# Patient Record
Sex: FEMALE | Race: WHITE | NOT HISPANIC OR LATINO | Employment: UNEMPLOYED | URBAN - METROPOLITAN AREA
[De-identification: names, ages, dates, MRNs, and addresses within clinical notes are randomized per-mention and may not be internally consistent; named-entity substitution may affect disease eponyms.]

---

## 2017-02-13 ENCOUNTER — COMMUNICATION - HEALTHEAST (OUTPATIENT)
Dept: FAMILY MEDICINE | Facility: CLINIC | Age: 37
End: 2017-02-13

## 2017-02-13 DIAGNOSIS — F41.9 ANXIETY AND DEPRESSION: ICD-10-CM

## 2017-02-13 DIAGNOSIS — F32.A ANXIETY AND DEPRESSION: ICD-10-CM

## 2017-04-01 ENCOUNTER — COMMUNICATION - HEALTHEAST (OUTPATIENT)
Dept: FAMILY MEDICINE | Facility: CLINIC | Age: 37
End: 2017-04-01

## 2017-08-25 ENCOUNTER — COMMUNICATION - HEALTHEAST (OUTPATIENT)
Dept: FAMILY MEDICINE | Facility: CLINIC | Age: 37
End: 2017-08-25

## 2017-09-08 ENCOUNTER — PRENATAL OFFICE VISIT - HEALTHEAST (OUTPATIENT)
Dept: FAMILY MEDICINE | Facility: CLINIC | Age: 37
End: 2017-09-08

## 2017-09-08 ENCOUNTER — HOSPITAL ENCOUNTER (OUTPATIENT)
Dept: ULTRASOUND IMAGING | Facility: HOSPITAL | Age: 37
Discharge: HOME | End: 2017-09-08
Attending: PHYSICIAN ASSISTANT

## 2017-09-08 DIAGNOSIS — Z34.90 NORMAL PREGNANCY: ICD-10-CM

## 2017-09-08 DIAGNOSIS — N83.202 CYST OF LEFT OVARY: ICD-10-CM

## 2017-09-08 DIAGNOSIS — N92.6 ABNORMAL MENSES: ICD-10-CM

## 2017-09-08 DIAGNOSIS — O09.529 ADVANCED MATERNAL AGE IN MULTIGRAVIDA: ICD-10-CM

## 2017-09-08 DIAGNOSIS — Z32.01 POSITIVE PREGNANCY TEST: ICD-10-CM

## 2017-09-08 LAB — HIV 1+2 AB+HIV1 P24 AG SERPL QL IA: NEGATIVE

## 2017-09-09 LAB — HBV SURFACE AG SERPL QL IA: NEGATIVE

## 2017-09-11 LAB
HAV IGG SER QL IA: POSITIVE
HEPATITIS B SURFACE ANTIBODY LHE- HISTORICAL: NEGATIVE
SYPHILIS RPR SCREEN - HISTORICAL: NORMAL

## 2017-10-13 ENCOUNTER — TRANSFERRED RECORDS (OUTPATIENT)
Dept: HEALTH INFORMATION MANAGEMENT | Facility: CLINIC | Age: 37
End: 2017-10-13

## 2017-10-13 ENCOUNTER — PRENATAL OFFICE VISIT - HEALTHEAST (OUTPATIENT)
Dept: FAMILY MEDICINE | Facility: CLINIC | Age: 37
End: 2017-10-13

## 2017-10-13 DIAGNOSIS — N89.8 VAGINAL DISCHARGE: ICD-10-CM

## 2017-10-13 DIAGNOSIS — Z34.92 NORMAL PREGNANCY IN SECOND TRIMESTER: ICD-10-CM

## 2017-10-13 DIAGNOSIS — N83.202 LEFT OVARIAN CYST: ICD-10-CM

## 2017-10-13 DIAGNOSIS — O09.522 ELDERLY MULTIGRAVIDA IN SECOND TRIMESTER: ICD-10-CM

## 2017-10-13 LAB
HPV ABSTRACT: ABNORMAL
PAP-ABSTRACT: NORMAL

## 2017-10-13 ASSESSMENT — MIFFLIN-ST. JEOR: SCORE: 1152.16

## 2017-10-14 ENCOUNTER — COMMUNICATION - HEALTHEAST (OUTPATIENT)
Dept: FAMILY MEDICINE | Facility: CLINIC | Age: 37
End: 2017-10-14

## 2017-10-17 ENCOUNTER — AMBULATORY - HEALTHEAST (OUTPATIENT)
Dept: LAB | Facility: CLINIC | Age: 37
End: 2017-10-17

## 2017-10-17 ENCOUNTER — COMMUNICATION - HEALTHEAST (OUTPATIENT)
Dept: FAMILY MEDICINE | Facility: CLINIC | Age: 37
End: 2017-10-17

## 2017-10-17 DIAGNOSIS — Z34.92 NORMAL PREGNANCY IN SECOND TRIMESTER: ICD-10-CM

## 2017-10-17 DIAGNOSIS — O98.812 CHLAMYDIA INFECTION AFFECTING PREGNANCY IN SECOND TRIMESTER, ANTEPARTUM: ICD-10-CM

## 2017-10-17 DIAGNOSIS — A74.9 CHLAMYDIA INFECTION AFFECTING PREGNANCY IN SECOND TRIMESTER, ANTEPARTUM: ICD-10-CM

## 2017-10-23 LAB
HPV INTERPRETATION - HISTORICAL: ABNORMAL
HPV INTERPRETER - HISTORICAL: ABNORMAL

## 2017-10-24 LAB
BKR LAB AP ABNORMAL BLEEDING: NO
BKR LAB AP BIRTH CONTROL/HORMONES: ABNORMAL
BKR LAB AP CERVICAL APPEARANCE: NORMAL
BKR LAB AP GYN ADEQUACY: ABNORMAL
BKR LAB AP GYN INTERPRETATION: ABNORMAL
BKR LAB AP HPV REFLEX: ABNORMAL
BKR LAB AP LMP: ABNORMAL
BKR LAB AP PATIENT STATUS: ABNORMAL
BKR LAB AP PREVIOUS ABNORMAL: ABNORMAL
BKR LAB AP PREVIOUS NORMAL: ABNORMAL
HIGH RISK?: YES
PATH REPORT.COMMENTS IMP SPEC: ABNORMAL
RESULT FLAG (HE HISTORICAL CONVERSION): ABNORMAL

## 2017-10-25 ENCOUNTER — COMMUNICATION - HEALTHEAST (OUTPATIENT)
Dept: FAMILY MEDICINE | Facility: CLINIC | Age: 37
End: 2017-10-25

## 2017-10-25 DIAGNOSIS — R87.613 HSIL (HIGH GRADE SQUAMOUS INTRAEPITHELIAL LESION) ON PAP SMEAR OF CERVIX: ICD-10-CM

## 2017-10-25 DIAGNOSIS — Z3A.14 14 WEEKS GESTATION OF PREGNANCY: ICD-10-CM

## 2017-11-27 ENCOUNTER — PRENATAL OFFICE VISIT - HEALTHEAST (OUTPATIENT)
Dept: FAMILY MEDICINE | Facility: CLINIC | Age: 37
End: 2017-11-27

## 2017-11-27 DIAGNOSIS — Z34.92 NORMAL PREGNANCY IN SECOND TRIMESTER: ICD-10-CM

## 2017-11-27 DIAGNOSIS — A74.9 CHLAMYDIA: ICD-10-CM

## 2017-11-27 DIAGNOSIS — N83.202 LEFT OVARIAN CYST: ICD-10-CM

## 2017-11-28 ENCOUNTER — HOSPITAL ENCOUNTER (OUTPATIENT)
Dept: ULTRASOUND IMAGING | Facility: HOSPITAL | Age: 37
Discharge: HOME | End: 2017-11-28
Attending: FAMILY MEDICINE

## 2017-11-28 DIAGNOSIS — Z34.92 NORMAL PREGNANCY IN SECOND TRIMESTER: ICD-10-CM

## 2018-02-06 ENCOUNTER — COMMUNICATION - HEALTHEAST (OUTPATIENT)
Dept: FAMILY MEDICINE | Facility: CLINIC | Age: 38
End: 2018-02-06

## 2018-02-16 ENCOUNTER — COMMUNICATION - HEALTHEAST (OUTPATIENT)
Dept: FAMILY MEDICINE | Facility: CLINIC | Age: 38
End: 2018-02-16

## 2018-02-19 ENCOUNTER — COMMUNICATION - HEALTHEAST (OUTPATIENT)
Dept: FAMILY MEDICINE | Facility: CLINIC | Age: 38
End: 2018-02-19

## 2018-02-21 ENCOUNTER — COMMUNICATION - HEALTHEAST (OUTPATIENT)
Dept: FAMILY MEDICINE | Facility: CLINIC | Age: 38
End: 2018-02-21

## 2018-03-03 ENCOUNTER — COMMUNICATION - HEALTHEAST (OUTPATIENT)
Dept: FAMILY MEDICINE | Facility: CLINIC | Age: 38
End: 2018-03-03

## 2018-03-08 ENCOUNTER — COMMUNICATION - HEALTHEAST (OUTPATIENT)
Dept: FAMILY MEDICINE | Facility: CLINIC | Age: 38
End: 2018-03-08

## 2018-03-30 ENCOUNTER — COMMUNICATION - HEALTHEAST (OUTPATIENT)
Dept: FAMILY MEDICINE | Facility: CLINIC | Age: 38
End: 2018-03-30

## 2018-04-17 ENCOUNTER — ANESTHESIA - HEALTHEAST (OUTPATIENT)
Dept: OBGYN | Facility: HOSPITAL | Age: 38
End: 2018-04-17

## 2018-04-17 ENCOUNTER — RECORDS - HEALTHEAST (OUTPATIENT)
Dept: ADMINISTRATIVE | Facility: OTHER | Age: 38
End: 2018-04-17

## 2018-04-17 ENCOUNTER — SURGERY - HEALTHEAST (OUTPATIENT)
Dept: OBGYN | Facility: HOSPITAL | Age: 38
End: 2018-04-17

## 2018-04-17 ASSESSMENT — MIFFLIN-ST. JEOR: SCORE: 1197.52

## 2018-04-21 ENCOUNTER — HOME CARE/HOSPICE - HEALTHEAST (OUTPATIENT)
Dept: HOME HEALTH SERVICES | Facility: HOME HEALTH | Age: 38
End: 2018-04-21

## 2018-04-22 ENCOUNTER — COMMUNICATION - HEALTHEAST (OUTPATIENT)
Dept: SCHEDULING | Facility: CLINIC | Age: 38
End: 2018-04-22

## 2018-05-03 ENCOUNTER — COMMUNICATION - HEALTHEAST (OUTPATIENT)
Dept: FAMILY MEDICINE | Facility: CLINIC | Age: 38
End: 2018-05-03

## 2019-09-27 ENCOUNTER — HOSPITAL ENCOUNTER (OUTPATIENT)
Facility: CLINIC | Age: 39
Discharge: HOME | End: 2019-09-27
Attending: OBSTETRICS & GYNECOLOGY | Admitting: OBSTETRICS & GYNECOLOGY

## 2019-09-27 ENCOUNTER — APPOINTMENT (OUTPATIENT)
Dept: ULTRASOUND IMAGING | Facility: CLINIC | Age: 39
End: 2019-09-27
Attending: OBSTETRICS & GYNECOLOGY

## 2019-09-27 ENCOUNTER — PREP FOR PROCEDURE (OUTPATIENT)
Dept: OBGYN | Facility: CLINIC | Age: 39
End: 2019-09-27

## 2019-09-27 VITALS — SYSTOLIC BLOOD PRESSURE: 114 MMHG | RESPIRATION RATE: 20 BRPM | TEMPERATURE: 98.9 F | DIASTOLIC BLOOD PRESSURE: 71 MMHG

## 2019-09-27 DIAGNOSIS — O34.219 PREVIOUS CESAREAN DELIVERY, ANTEPARTUM CONDITION OR COMPLICATION: Primary | ICD-10-CM

## 2019-09-27 PROBLEM — O26.899 ABDOMINAL PAIN IN PREGNANCY: Status: ACTIVE | Noted: 2019-09-27

## 2019-09-27 PROBLEM — R10.9 ABDOMINAL PAIN IN PREGNANCY: Status: ACTIVE | Noted: 2019-09-27

## 2019-09-27 PROBLEM — Z34.80 SUPERVISION OF OTHER NORMAL PREGNANCY: Status: ACTIVE | Noted: 2019-09-27

## 2019-09-27 LAB
ABO + RH BLD: NORMAL
ABO + RH BLD: NORMAL
ALBUMIN UR-MCNC: NEGATIVE MG/DL
ALT SERPL W P-5'-P-CCNC: 11 U/L (ref 0–50)
AMPHETAMINES UR QL SCN: NEGATIVE
APPEARANCE UR: CLEAR
AST SERPL W P-5'-P-CCNC: 15 U/L (ref 0–45)
BARBITURATES UR QL: NEGATIVE
BENZODIAZ UR QL: NEGATIVE
BILIRUB UR QL STRIP: NEGATIVE
BLD GP AB SCN SERPL QL: NORMAL
BLOOD BANK CMNT PATIENT-IMP: NORMAL
CANNABINOIDS UR QL SCN: NEGATIVE
COCAINE UR QL: NEGATIVE
COLOR UR AUTO: ABNORMAL
CREAT SERPL-MCNC: 0.47 MG/DL (ref 0.52–1.04)
CREAT UR-MCNC: 29 MG/DL
ERYTHROCYTE [DISTWIDTH] IN BLOOD BY AUTOMATED COUNT: 16.8 % (ref 10–15)
GFR SERPL CREATININE-BSD FRML MDRD: >90 ML/MIN/{1.73_M2}
GLUCOSE UR STRIP-MCNC: NEGATIVE MG/DL
HBA1C MFR BLD: 5 % (ref 0–5.6)
HCT VFR BLD AUTO: 28.1 % (ref 35–47)
HGB BLD-MCNC: 8.2 G/DL (ref 11.7–15.7)
HGB UR QL STRIP: NEGATIVE
KETONES UR STRIP-MCNC: NEGATIVE MG/DL
LEUKOCYTE ESTERASE UR QL STRIP: ABNORMAL
MCH RBC QN AUTO: 22.5 PG (ref 26.5–33)
MCHC RBC AUTO-ENTMCNC: 29.2 G/DL (ref 31.5–36.5)
MCV RBC AUTO: 77 FL (ref 78–100)
NITRATE UR QL: NEGATIVE
OPIATES UR QL SCN: NEGATIVE
PCP UR QL SCN: NEGATIVE
PH UR STRIP: 7 PH (ref 5–7)
PLATELET # BLD AUTO: 223 10E9/L (ref 150–450)
PROT UR-MCNC: 0.12 G/L
PROT/CREAT 24H UR: 0.41 G/G CR (ref 0–0.2)
RBC # BLD AUTO: 3.65 10E12/L (ref 3.8–5.2)
RBC #/AREA URNS AUTO: <1 /HPF (ref 0–2)
SOURCE: ABNORMAL
SP GR UR STRIP: 1.01 (ref 1–1.03)
SPECIMEN EXP DATE BLD: NORMAL
SQUAMOUS #/AREA URNS AUTO: 3 /HPF (ref 0–1)
UROBILINOGEN UR STRIP-MCNC: 0 MG/DL (ref 0–2)
WBC # BLD AUTO: 11.1 10E9/L (ref 4–11)
WBC #/AREA URNS AUTO: 2 /HPF (ref 0–5)

## 2019-09-27 PROCEDURE — 76805 OB US >/= 14 WKS SNGL FETUS: CPT

## 2019-09-27 PROCEDURE — 87491 CHLMYD TRACH DNA AMP PROBE: CPT | Performed by: OBSTETRICS & GYNECOLOGY

## 2019-09-27 PROCEDURE — 80307 DRUG TEST PRSMV CHEM ANLYZR: CPT | Performed by: OBSTETRICS & GYNECOLOGY

## 2019-09-27 PROCEDURE — 87591 N.GONORRHOEAE DNA AMP PROB: CPT | Performed by: OBSTETRICS & GYNECOLOGY

## 2019-09-27 PROCEDURE — 81001 URINALYSIS AUTO W/SCOPE: CPT | Performed by: OBSTETRICS & GYNECOLOGY

## 2019-09-27 PROCEDURE — 82565 ASSAY OF CREATININE: CPT | Performed by: OBSTETRICS & GYNECOLOGY

## 2019-09-27 PROCEDURE — 86780 TREPONEMA PALLIDUM: CPT | Performed by: OBSTETRICS & GYNECOLOGY

## 2019-09-27 PROCEDURE — 87389 HIV-1 AG W/HIV-1&-2 AB AG IA: CPT | Performed by: OBSTETRICS & GYNECOLOGY

## 2019-09-27 PROCEDURE — 83036 HEMOGLOBIN GLYCOSYLATED A1C: CPT | Performed by: OBSTETRICS & GYNECOLOGY

## 2019-09-27 PROCEDURE — 87653 STREP B DNA AMP PROBE: CPT | Performed by: OBSTETRICS & GYNECOLOGY

## 2019-09-27 PROCEDURE — 86901 BLOOD TYPING SEROLOGIC RH(D): CPT | Performed by: OBSTETRICS & GYNECOLOGY

## 2019-09-27 PROCEDURE — 84450 TRANSFERASE (AST) (SGOT): CPT | Performed by: OBSTETRICS & GYNECOLOGY

## 2019-09-27 PROCEDURE — 36415 COLL VENOUS BLD VENIPUNCTURE: CPT | Performed by: OBSTETRICS & GYNECOLOGY

## 2019-09-27 PROCEDURE — 99213 OFFICE O/P EST LOW 20 MIN: CPT | Mod: 25 | Performed by: OBSTETRICS & GYNECOLOGY

## 2019-09-27 PROCEDURE — 86762 RUBELLA ANTIBODY: CPT | Performed by: OBSTETRICS & GYNECOLOGY

## 2019-09-27 PROCEDURE — 85027 COMPLETE CBC AUTOMATED: CPT | Performed by: OBSTETRICS & GYNECOLOGY

## 2019-09-27 PROCEDURE — 86850 RBC ANTIBODY SCREEN: CPT | Performed by: OBSTETRICS & GYNECOLOGY

## 2019-09-27 PROCEDURE — 84460 ALANINE AMINO (ALT) (SGPT): CPT | Performed by: OBSTETRICS & GYNECOLOGY

## 2019-09-27 PROCEDURE — 59025 FETAL NON-STRESS TEST: CPT | Mod: 26 | Performed by: OBSTETRICS & GYNECOLOGY

## 2019-09-27 PROCEDURE — 84156 ASSAY OF PROTEIN URINE: CPT | Performed by: OBSTETRICS & GYNECOLOGY

## 2019-09-27 PROCEDURE — 86900 BLOOD TYPING SEROLOGIC ABO: CPT | Performed by: OBSTETRICS & GYNECOLOGY

## 2019-09-27 PROCEDURE — 87340 HEPATITIS B SURFACE AG IA: CPT | Performed by: OBSTETRICS & GYNECOLOGY

## 2019-09-27 PROCEDURE — 25800030 ZZH RX IP 258 OP 636: Performed by: OBSTETRICS & GYNECOLOGY

## 2019-09-27 PROCEDURE — G0463 HOSPITAL OUTPT CLINIC VISIT: HCPCS | Mod: 25

## 2019-09-27 PROCEDURE — 96360 HYDRATION IV INFUSION INIT: CPT

## 2019-09-27 PROCEDURE — 40000809 ZZH STATISTIC NO DOCUMENTATION TO SUPPORT CHARGE

## 2019-09-27 PROCEDURE — 59025 FETAL NON-STRESS TEST: CPT

## 2019-09-27 RX ORDER — CEFAZOLIN SODIUM 1 G/50ML
1 INJECTION, SOLUTION INTRAVENOUS SEE ADMIN INSTRUCTIONS
Status: CANCELLED | OUTPATIENT
Start: 2019-09-27

## 2019-09-27 RX ORDER — ONDANSETRON 2 MG/ML
4 INJECTION INTRAMUSCULAR; INTRAVENOUS EVERY 6 HOURS PRN
Status: DISCONTINUED | OUTPATIENT
Start: 2019-09-27 | End: 2019-09-27

## 2019-09-27 RX ORDER — ONDANSETRON 2 MG/ML
4 INJECTION INTRAMUSCULAR; INTRAVENOUS EVERY 6 HOURS PRN
Status: DISCONTINUED | OUTPATIENT
Start: 2019-09-27 | End: 2019-09-27 | Stop reason: HOSPADM

## 2019-09-27 RX ORDER — CEFAZOLIN SODIUM 2 G/100ML
2 INJECTION, SOLUTION INTRAVENOUS
Status: CANCELLED | OUTPATIENT
Start: 2019-09-27

## 2019-09-27 RX ORDER — PHENAZOPYRIDINE HYDROCHLORIDE 100 MG/1
200 TABLET, FILM COATED ORAL ONCE
Status: CANCELLED | OUTPATIENT
Start: 2019-09-27 | End: 2019-09-27

## 2019-09-27 RX ADMIN — SODIUM CHLORIDE, POTASSIUM CHLORIDE, SODIUM LACTATE AND CALCIUM CHLORIDE 1000 ML: 600; 310; 30; 20 INJECTION, SOLUTION INTRAVENOUS at 17:26

## 2019-09-27 NOTE — H&P
"[facility]   OB/GYN     History and Physical Exam     Subjective:    Courtney is a 39 year old  at 39w0d by LMP of Dec 28th, 2018 who presents to triage with decreased fetal movement, left lower quadrant abdominal pain and lower extremity swelling.   She has had no prenatal care in this pregnancy due to a lack of health insurance. She is currently living with her fiandrae's father and has been in contact with the [facility] . Her insurance is in process, but does have the number for the [employer] and plans to talk to them. Her 19 year old son lives with his girlfriend. 16yo daughter lives with an aunt in [place]. 17 month old daughter lives with her and her fiance.   She denies any vaginal bleeding or loss of fluid. She does have left abdominal pain that wraps around to her lower back, which she reports has been a chronic problem in this pregnancy, and even pre-dates pregnancy. She does not feel any contractions.   Reports her swelling has improved with elevating her feet.   Reports eating and drinking normally with a regular diet including red meat.    Pregnancy complications:   -- No PNC  -- Hx of  delivery for placental abruption   -- History of substance use, but denies use in this pregnancy   -- History of anemia, previously on iron, required 2U blood transfusion with c/s delivery/abruption   -- Depression/anxiety/PTSD; feels depressed currently mostly due to their financial troubles, no meds, reports fleeting thoughts that she'd \"be better off dead,\" but denies any plans to act on these thoughts, unable to afford to pay cash for her meds   -- Desired sterilization, but was not sterilized with her last delivery due to limited PNC     OB History    Para Term  AB Living   8 3 2 0 4 3   SAB TAB Ectopic Multiple Live Births   2 2 0 0 3      # Outcome Date GA Lbr Demarco/2nd Weight Sex Delivery Anes PTL Lv   8 Current            7 Term 18 " "39w0d  3.742 kg (8 lb 4 oz) F CS-LTranv   WOODY   6 SAB 01/01/15        FD   5 SAB 14        FD   4 TAB 05        FD   3 TAB 03        FD   2 Para 02 40w1d  3.742 kg (8 lb 4 oz)  Vag-Spont   WOODY   1 Term 00 39w0d  2.977 kg (6 lb 9 oz)  Vag-Spont   WOODY   She denies issues with diabetes or high blood pressure.     Past Medical History:   Diagnosis Date     Chronic back pain      History of anemia      History of substance abuse        Past Surgical History:   Procedure Laterality Date     BACK SURGERY      Age 15 for bone spur       SECTION      2018 for abruption        No current outpatient medications on file.       Family History   Problem Relation Age of Onset     Depression Mother      Unknown/Adopted Father      Depression Brother      Unknown/Adopted Maternal Grandmother      Unknown/Adopted Maternal Grandfather        Social History     Tobacco Use     Smoking status: Never Smoker     Smokeless tobacco: Never Used   Substance Use Topics     Alcohol use: Yes     Comment: quit with pregnancy       ROS: A 10 pt ROS was completed and found to be negative unless mentioned in the HPI.     Objective:  /79   Temp 98.9  F (37.2  C)   Resp 20     Estimated body mass index is 18.42 kg/m  as calculated from the following:    Height as of 14: 1.6 m (5' 3\").    Weight as of 14: 47.2 kg (104 lb).    Physical Exam:  Gen: Pleasant, talkative female in no apparent distress   Respiratory: breathing comfortably on room air   Cardiac: Regular rate, warm and well-perfused.   GI: Abd soft and non-tender, cephalic fetal presentation, EFW 6lbs  MSK: Grossly normal movement of all four extremities  Psych: mood and affect bright   Lower extremity: edema not present   Cervix: closed/long/high     NST: baseline 130-150, mod variability, + accels, no decels  [place]: 2-4 in 10 min, mild, not palpable by the patient     Component      Latest Ref Rng & Units 2019   Color Urine       " Straw   Appearance Urine       Clear   Glucose Urine      NEG:Negative mg/dL Negative   Bilirubin Urine      NEG:Negative Negative   Ketones Urine      NEG:Negative mg/dL Negative   Specific Gravity Urine      1.003 - 1.035 1.009   Blood Urine      NEG:Negative Negative   pH Urine      5.0 - 7.0 pH 7.0   Protein Albumin Urine      NEG:Negative mg/dL Negative   Urobilinogen mg/dL      0.0 - 2.0 mg/dL 0.0   Nitrite Urine      NEG:Negative Negative   Leukocyte Esterase Urine      NEG:Negative Trace (A)   Source       Midstream Urine   WBC Urine      0 - 5 /HPF 2   RBC Urine      0 - 2 /HPF <1   Squamous Epithelial /HPF Urine      0 - 1 /HPF 3 (H)   WBC      4.0 - 11.0 10e9/L 11.1 (H)   RBC Count      3.8 - 5.2 10e12/L 3.65 (L)   Hemoglobin      11.7 - 15.7 g/dL 8.2 (L)   Hematocrit      35.0 - 47.0 % 28.1 (L)   MCV      78 - 100 fl 77 (L)   MCH      26.5 - 33.0 pg 22.5 (L)   MCHC      31.5 - 36.5 g/dL 29.2 (L)   RDW      10.0 - 15.0 % 16.8 (H)   Platelet Count      150 - 450 10e9/L 223   Amphetamine Qual Urine      NEG:Negative Negative   Barbiturates Qual Urine      NEG:Negative Negative   Benzodiazepine Qual Urine      NEG:Negative Negative   Cannabinoids Qual Urine      NEG:Negative Negative   Cocaine Qual Urine      NEG:Negative Negative   Opiates Qualitative Urine      NEG:Negative Negative   Pcp Qual Urine      NEG:Negative Negative   ABO       O   RH(D)       Pos   Antibody Screen       Neg   Test Valid Only At       [facility]   Specimen Expires       2019   Creatinine      0.52 - 1.04 mg/dL 0.47 (L)   GFR Estimate      >60 mL/min/1.73:m2 >90   GFR Estimate If Black      >60 mL/min/1.73:m2 >90   Protein Random Urine      g/L 0.12   Protein Total Urine g/gr Creatinine      0 - 0.2 g/g Cr 0.41 (H)   AST      0 - 45 U/L 15   ALT      0 - 50 U/L 11   Creatinine Urine      mg/dL 29   Hemoglobin A1C      0 - 5.6 % 5.0     Assessment/Plan: 38 yo  at 39w0d by LMP c/w 39w0d US who presents for  decreased fetal movement, left lower quadrant abdominal pain and lower extremity swelling. All of these symptoms had resolved by the time I interviewed and examined the patient.     -- No PNC; UDS negative, # given for [facility], declines [facility] as she has a [facility]. I did draw her PMC labs, collected a GBS and performed an obstetrical US.   -- Hx of  delivery for placental abruption: discussed delivery options including repeat  delivery vs TOLAC. Discussed risks of uterine rupture and need for emergent  delivery. Discussed the risks of repeat c/s including bleeding/infection/intraabdominal injury. Discussed expected recoveries with both options. Pt would like repeat c/s. As far as timing of delivery, the patient has very poor obstetrical dating (LMP and a 3rd trimester OB US). I discussed that this US could be as far as 3 weeks off in the 3rd trimester, and the potential poor fetal outcomes associated with a  delivery. I did discuss delivery timing with MFM Dr. Praveen Moon, who recommended delivery at 40wks. This was scheduled for this patient accordingly. Would delivery sooner should labor arise. Pt has contractions noted on tocometry, but these were not palpated by the patient. Cervix was noted to be close, long and high, so labor ruled out.   -- Polyhydramnios: VANESSA 24.3cm, plan for NST on Mon at scheduled f/u appt. Reviewed this finding with Dr. Praveen Moon who recommended against proceeding with delivery based on this finding, given her poor obstetrical dating. Delivery planned with c/s at 40wks. HgbA1c was drawn and was normal, making gestational diabetes unlikely.   -- Proteinuria: BPs nl during triage visit with no noted edema. Will continue to monitor at her planned OB visit. HELLP labs nl.   -- History of substance use: but denies use in this pregnancy, UDS neg   -- History of anemia, previously on iron, required 2U blood transfusion with c/s  "delivery/abruption: Hgb 8.2 on CBC today. Recommended high iron diet until delivery. Pt unable to afford po iron and little utility for IV iron this close to surgery. Anticipate possible need for blood at the time of c/s, but will be careful to minimize blood loss.    -- Depression/anxiety/PTSD; feels depressed currently mostly due to their financial troubles, no meds, reports fleeting thoughts that she'd \"be better off dead,\" but denies any plans to act on these thoughts, unable to afford to pay cash for her meds. SI/HI precautions reviewed.   -- Desired sterilization, but was not sterilized with her last delivery due to limited PNC, FTP signed today, but I did discuss that she won't have met the one month waiting period after signing FTP to complete a tubal ligation. Discussed options of interval tubal, IUD placed at the time of surgery, Nexplanon or depo provera placed during her postpartum hospitalization. Discussed side effects and bleeding profiles with each option, and she strongly desires placement of a Nexplanon within her hospitalization. Given her high risk of loss to follow-up and unintended pregnancy, I think this is warranted.   -- Fetal well-being: Cat 1 reactive FHT       Appointment was scheduled for 9/30 at 2:45 PM with me in clinic. Will plan an NST at that time      Tanika Cifuentes MD  OB/GYN  9/27/2019        "

## 2019-09-27 NOTE — H&P (VIEW-ONLY)
"[facility]   OB/GYN     History and Physical Exam     Subjective:    Courtney is a 39 year old  at 39w0d by LMP of Dec 28th, 2018 who presents to triage with decreased fetal movement, left lower quadrant abdominal pain and lower extremity swelling.   She has had no prenatal care in this pregnancy due to a lack of health insurance. She is currently living with her fiandrae's father and has been in contact with the [facility] . Her insurance is in process, but does have the number for the [employer] and plans to talk to them. Her 19 year old son lives with his girlfriend. 16yo daughter lives with an aunt in [place]. 17 month old daughter lives with her and her fiance.   She denies any vaginal bleeding or loss of fluid. She does have left abdominal pain that wraps around to her lower back, which she reports has been a chronic problem in this pregnancy, and even pre-dates pregnancy. She does not feel any contractions.   Reports her swelling has improved with elevating her feet.   Reports eating and drinking normally with a regular diet including red meat.    Pregnancy complications:   -- No PNC  -- Hx of  delivery for placental abruption   -- History of substance use, but denies use in this pregnancy   -- History of anemia, previously on iron, required 2U blood transfusion with c/s delivery/abruption   -- Depression/anxiety/PTSD; feels depressed currently mostly due to their financial troubles, no meds, reports fleeting thoughts that she'd \"be better off dead,\" but denies any plans to act on these thoughts, unable to afford to pay cash for her meds   -- Desired sterilization, but was not sterilized with her last delivery due to limited PNC     OB History    Para Term  AB Living   8 3 2 0 4 3   SAB TAB Ectopic Multiple Live Births   2 2 0 0 3      # Outcome Date GA Lbr Demarco/2nd Weight Sex Delivery Anes PTL Lv   8 Current            7 Term 18 " "39w0d  3.742 kg (8 lb 4 oz) F CS-LTranv   WOODY   6 SAB 01/01/15        FD   5 SAB 14        FD   4 TAB 05        FD   3 TAB 03        FD   2 Para 02 40w1d  3.742 kg (8 lb 4 oz)  Vag-Spont   WOODY   1 Term 00 39w0d  2.977 kg (6 lb 9 oz)  Vag-Spont   WOODY   She denies issues with diabetes or high blood pressure.     Past Medical History:   Diagnosis Date     Chronic back pain      History of anemia      History of substance abuse        Past Surgical History:   Procedure Laterality Date     BACK SURGERY      Age 15 for bone spur       SECTION      2018 for abruption        No current outpatient medications on file.       Family History   Problem Relation Age of Onset     Depression Mother      Unknown/Adopted Father      Depression Brother      Unknown/Adopted Maternal Grandmother      Unknown/Adopted Maternal Grandfather        Social History     Tobacco Use     Smoking status: Never Smoker     Smokeless tobacco: Never Used   Substance Use Topics     Alcohol use: Yes     Comment: quit with pregnancy       ROS: A 10 pt ROS was completed and found to be negative unless mentioned in the HPI.     Objective:  /79   Temp 98.9  F (37.2  C)   Resp 20     Estimated body mass index is 18.42 kg/m  as calculated from the following:    Height as of 14: 1.6 m (5' 3\").    Weight as of 14: 47.2 kg (104 lb).    Physical Exam:  Gen: Pleasant, talkative female in no apparent distress   Respiratory: breathing comfortably on room air   Cardiac: Regular rate, warm and well-perfused.   GI: Abd soft and non-tender, cephalic fetal presentation, EFW 6lbs  MSK: Grossly normal movement of all four extremities  Psych: mood and affect bright   Lower extremity: edema not present   Cervix: closed/long/high     NST: baseline 130-150, mod variability, + accels, no decels  [place]: 2-4 in 10 min, mild, not palpable by the patient     Component      Latest Ref Rng & Units 2019   Color Urine       " Straw   Appearance Urine       Clear   Glucose Urine      NEG:Negative mg/dL Negative   Bilirubin Urine      NEG:Negative Negative   Ketones Urine      NEG:Negative mg/dL Negative   Specific Gravity Urine      1.003 - 1.035 1.009   Blood Urine      NEG:Negative Negative   pH Urine      5.0 - 7.0 pH 7.0   Protein Albumin Urine      NEG:Negative mg/dL Negative   Urobilinogen mg/dL      0.0 - 2.0 mg/dL 0.0   Nitrite Urine      NEG:Negative Negative   Leukocyte Esterase Urine      NEG:Negative Trace (A)   Source       Midstream Urine   WBC Urine      0 - 5 /HPF 2   RBC Urine      0 - 2 /HPF <1   Squamous Epithelial /HPF Urine      0 - 1 /HPF 3 (H)   WBC      4.0 - 11.0 10e9/L 11.1 (H)   RBC Count      3.8 - 5.2 10e12/L 3.65 (L)   Hemoglobin      11.7 - 15.7 g/dL 8.2 (L)   Hematocrit      35.0 - 47.0 % 28.1 (L)   MCV      78 - 100 fl 77 (L)   MCH      26.5 - 33.0 pg 22.5 (L)   MCHC      31.5 - 36.5 g/dL 29.2 (L)   RDW      10.0 - 15.0 % 16.8 (H)   Platelet Count      150 - 450 10e9/L 223   Amphetamine Qual Urine      NEG:Negative Negative   Barbiturates Qual Urine      NEG:Negative Negative   Benzodiazepine Qual Urine      NEG:Negative Negative   Cannabinoids Qual Urine      NEG:Negative Negative   Cocaine Qual Urine      NEG:Negative Negative   Opiates Qualitative Urine      NEG:Negative Negative   Pcp Qual Urine      NEG:Negative Negative   ABO       O   RH(D)       Pos   Antibody Screen       Neg   Test Valid Only At       [facility]   Specimen Expires       2019   Creatinine      0.52 - 1.04 mg/dL 0.47 (L)   GFR Estimate      >60 mL/min/1.73:m2 >90   GFR Estimate If Black      >60 mL/min/1.73:m2 >90   Protein Random Urine      g/L 0.12   Protein Total Urine g/gr Creatinine      0 - 0.2 g/g Cr 0.41 (H)   AST      0 - 45 U/L 15   ALT      0 - 50 U/L 11   Creatinine Urine      mg/dL 29   Hemoglobin A1C      0 - 5.6 % 5.0     Assessment/Plan: 38 yo  at 39w0d by LMP c/w 39w0d US who presents for  decreased fetal movement, left lower quadrant abdominal pain and lower extremity swelling. All of these symptoms had resolved by the time I interviewed and examined the patient.     -- No PNC; UDS negative, # given for [employer] [facility], declines [facility] as she has a [facility]. I did draw her PMC labs, collected a GBS and performed an obstetrical US.   -- Hx of  delivery for placental abruption: discussed delivery options including repeat  delivery vs TOLAC. Discussed risks of uterine rupture and need for emergent  delivery. Discussed the risks of repeat c/s including bleeding/infection/intraabdominal injury. Discussed expected recoveries with both options. Pt would like repeat c/s. As far as timing of delivery, the patient has very poor obstetrical dating (LMP and a 3rd trimester OB US). I discussed that this US could be as far as 3 weeks off in the 3rd trimester, and the potential poor fetal outcomes associated with a  delivery. I did discuss delivery timing with MFM Dr. Praveen Moon, who recommended delivery at 40wks. This was scheduled for this patient accordingly. Would delivery sooner should labor arise. Pt has contractions noted on tocometry, but these were not palpated by the patient. Cervix was noted to be close, long and high, so labor ruled out.   -- Polyhydramnios: VANESSA 24.3cm, plan for NST on Mon at scheduled f/u appt. Reviewed this finding with Dr. Praveen Moon who recommended against proceeding with delivery based on this finding, given her poor obstetrical dating. Delivery planned with c/s at 40wks. HgbA1c was drawn and was normal, making gestational diabetes unlikely.   -- Proteinuria: BPs nl during triage visit with no noted edema. Will continue to monitor at her planned OB visit. HELLP labs nl.   -- History of substance use: but denies use in this pregnancy, UDS neg   -- History of anemia, previously on iron, required 2U blood transfusion with c/s  "delivery/abruption: Hgb 8.2 on CBC today. Recommended high iron diet until delivery. Pt unable to afford po iron and little utility for IV iron this close to surgery. Anticipate possible need for blood at the time of c/s, but will be careful to minimize blood loss.    -- Depression/anxiety/PTSD; feels depressed currently mostly due to their financial troubles, no meds, reports fleeting thoughts that she'd \"be better off dead,\" but denies any plans to act on these thoughts, unable to afford to pay cash for her meds. SI/HI precautions reviewed.   -- Desired sterilization, but was not sterilized with her last delivery due to limited PNC, FTP signed today, but I did discuss that she won't have met the one month waiting period after signing FTP to complete a tubal ligation. Discussed options of interval tubal, IUD placed at the time of surgery, Nexplanon or depo provera placed during her postpartum hospitalization. Discussed side effects and bleeding profiles with each option, and she strongly desires placement of a Nexplanon within her hospitalization. Given her high risk of loss to follow-up and unintended pregnancy, I think this is warranted.   -- Fetal well-being: Cat 1 reactive FHT       Appointment was scheduled for 9/30 at 2:45 PM with me in clinic. Will plan an NST at that time      Tanika Cifuentes MD  OB/GYN  9/27/2019        "

## 2019-09-27 NOTE — DISCHARGE INSTRUCTIONS
Discharge Instructions for Undelivered Patients    Diet:    Drink 8 to 12 glasses of liquids (milk, juice, water) every day.    You may eat meals and snacks.    Activity:      Count fetal kicks every day. (See handout.)    Call your doctor if your baby is moving less than usual.    Medicines:    My care team has reviewed my medicines with me.    My care team has given me a list of my medicines.    My care team has prescribed a new medicine. They have either sent it home with me or ordered it from the pharmacy.    Call your provider if you notice:    Swelling in your face or increased swelling in your hands or legs.    Headaches that are not relieved by Tylenol (acetaminophen).    Changes in your vision (blurring; seeing spots or stars).    Nausea (sick to your stomach) and vomiting (throwing up).    Weight gain of 5 pounds per week.    Heartburn that doesn't go away.    Signs of bladder infection: pain when you urinate (use the toilet), needing to go more often or more urgently.    The bag of mota (membranes) breaks, or you notice leaking in your underwear.    Bright red blood in your underwear.    Abdominal (lower belly) or stomach pain.    For first baby: Contractions (tightenings) less than 5 minutes apart for one hour or more.    For Second (plus) baby: Contractions (tightenings) less than 10 minutes apart and getting stronger.    Increase or change in vaginal discharge (note the color and amount).    Other:     Follow up with your provider: Sept 30th, 2:45pm with Dr. Cifuentes at [facility]     appt line [phone]    [place] [phone]

## 2019-09-27 NOTE — PLAN OF CARE
Reviewed policy regarding suicidal risk screen with MD. No need for 1:1 at this time per MD. Patient is not currently suicidal and has no plan. Patient reassured to return to the ED with any increase in suicidal thoughts or a desire to formulate a plan. SO at bedside.

## 2019-09-27 NOTE — PROGRESS NOTES
"Patient arrived via w/c from ER stating she is 39 weeks pregnant at 1341.  She is with her significant other Manuel Alvarado.  Patient has not had prenatal care with this pregnancy due to financial difficulties and inability to obtain financial assistance.  Currently living in [place] with father of sosa Valdez has the following concerns:  2 day history of decreased fetal movement, slight ankle swelling, and LUQ \"constant burning pain that goes into my back.  Rates pain 7/10.  Patient does not exhibit nonverbal indicators of pain.  History includes two vaginal deliveries and one prior c/s 17 months ago for abruption.  Patient states she is basing her gestation on lmp of 12/28.  Patient was placed on the fetal monitor.  Denies contractions, loss of fluid, or vaginal bleeding.  Denies use of cigarettes, alcohol, or drugs but was advised that we will drug screen her urine due to lack of prenatal care and she is in agreement.  Dr. Cifuentes was notified and will place orders.  Patient began having frequent contractions about 1430.  Patient advised of NPO status.  Report was given to Margaret Tran RN who will assume care of patient.  "

## 2019-09-27 NOTE — PLAN OF CARE
"Patient having frequent contractions and irritability but denies feeling them, palpate mild. Last meal was at 0830 this morning and hasn't had adequate PO intake of fluids per patient report. IVF bolus ordered and infusing. UA collected and shows trace leukocytes. Cervix closed upon speculum exam. FHT category 1 and reactive. Patient reporting +FM but states has decreased over past couple of days. States she has felt infant move since coming to the hospital. Ultrasound ordered and complete. Hgb 8.2, Dr Cifuentes notified. Patient reports past history of anemia, which she has taken iron for in the past, but admits to not taking now due to lack of health insurance. Patient states she has had suicidal ideations in the past 2 weeks but states, \"It's nothing I would act on.\" Patient has been prescribed antidepressants in the past but has been off them since losing health insurance. 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 Normally takes oxycodone for chronic pain but denies using medication during this pregnancy again due to lack of insurance coverage. Utox was negative. Patient's SO, Kenn, is at bedside and is supportive.   "

## 2019-09-28 LAB
GP B STREP DNA SPEC QL NAA+PROBE: NEGATIVE
RUBV IGG SERPL IA-ACNC: 17 IU/ML
SPECIMEN SOURCE: NORMAL
T PALLIDUM AB SER QL: NONREACTIVE

## 2019-09-28 NOTE — PLAN OF CARE
Dr Cifuentes has seen and assessed patient at bedside. Hgb A1C ordered and is 5.0. Plan is to discharge patient home with follow up clinic visit on  and scheduled  on Oct 4th. Patient and SO agree with POC.

## 2019-09-29 LAB
C TRACH DNA SPEC QL NAA+PROBE: NEGATIVE
N GONORRHOEA DNA SPEC QL NAA+PROBE: NEGATIVE
SPECIMEN SOURCE: NORMAL
SPECIMEN SOURCE: NORMAL

## 2019-09-30 ENCOUNTER — TELEPHONE (OUTPATIENT)
Dept: OBGYN | Facility: CLINIC | Age: 39
End: 2019-09-30

## 2019-09-30 ENCOUNTER — OFFICE VISIT (OUTPATIENT)
Dept: OBGYN | Facility: CLINIC | Age: 39
End: 2019-09-30

## 2019-09-30 VITALS
WEIGHT: 137 LBS | BODY MASS INDEX: 24.27 KG/M2 | DIASTOLIC BLOOD PRESSURE: 78 MMHG | HEART RATE: 115 BPM | RESPIRATION RATE: 18 BRPM | SYSTOLIC BLOOD PRESSURE: 127 MMHG | TEMPERATURE: 98.7 F | HEIGHT: 63 IN

## 2019-09-30 DIAGNOSIS — Z34.83 PRENATAL CARE, SUBSEQUENT PREGNANCY IN THIRD TRIMESTER: Primary | ICD-10-CM

## 2019-09-30 PROBLEM — O34.219 PREVIOUS CESAREAN DELIVERY, ANTEPARTUM CONDITION OR COMPLICATION: Status: ACTIVE | Noted: 2019-09-27

## 2019-09-30 LAB
HBV SURFACE AG SERPL QL IA: NONREACTIVE
HIV 1+2 AB+HIV1 P24 AG SERPL QL IA: NONREACTIVE

## 2019-09-30 PROCEDURE — 59025 FETAL NON-STRESS TEST: CPT | Performed by: OBSTETRICS & GYNECOLOGY

## 2019-09-30 PROCEDURE — 99213 OFFICE O/P EST LOW 20 MIN: CPT | Performed by: OBSTETRICS & GYNECOLOGY

## 2019-09-30 ASSESSMENT — MIFFLIN-ST. JEOR: SCORE: 1275.56

## 2019-09-30 NOTE — PROGRESS NOTES
"[facility]   OB/GYN      F/U OB Clinic      Subjective:     Courtney is a 39 year old  at 39w3d by LMP of Dec 28th, 2018 c/w 39w0d US who presented to OB clinic for f/u OB visit. Reports that her baby is still moving, but a little slower than throughout the rest of her pregnancy. Had a couple of uterine cramps, but no regular contractions. Continues to have low back pain. No LOF or VB.       Objective:  /78 (BP Location: Right arm, Patient Position: Chair, Cuff Size: Adult Small)   Pulse 115   Temp 98.7  F (37.1  C) (Tympanic)   Resp 18   Ht 1.6 m (5' 3\")   Wt 62.1 kg (137 lb)   LMP 2018   BMI 24.27 kg/m       Physical Exam:  Gen: Pleasant, talkative female in no apparent distress   Respiratory: breathing comfortably on room air   Cardiac: Regular rate, warm and well-perfused.   GI: Abd soft and non-tender, cephalic fetal presentation, EFW 6lbs  MSK: Grossly normal movement of all four extremities  Psych: mood and affect bright   Lower extremity: edema not present     See OB flowsheet      Assessment/Plan: 38 yo  at 39w3d by LMP c/w 39w0d  c/w 39w0d US who presents for f/U OB visit.   New OB labs normal, other than anemia, see below. GBS neg.   -- No PNC; UDS negative, # given for [employer] [facility], declines [facility] as she has a [facility] [place] [place].   -- Hx of  delivery for placental abruption: s/p counseling on delivery planning, pt desires repeat c/s which is scheduled for Fri 10/4. [facility] has recommended 40 wks delivery, given poor obstetrical dating.   -- Polyhydramnios: VANESSA 24.3cm, plan for NST today. Nl HgbA1c   -- Proteinuria: BPs nl during triage visit with no noted edema. BP remains normal today. HELLP labs nl.   -- History of substance use: but denies use in this pregnancy, UDS neg   -- History of anemia, previously on iron, required 2U blood transfusion with c/s delivery/abruption: Hgb 8.2 on CBC tin triage. Recommended high iron diet until " "delivery. Pt unable to afford po iron and little utility for IV iron this close to surgery. Anticipate possible need for blood at the time of c/s, but will be careful to minimize blood loss.    -- Depression/anxiety/PTSD; feels depressed currently mostly due to their financial troubles, no meds, reports fleeting thoughts that she'd \"be better off dead,\" but denies any plans to act on these thoughts, unable to afford to pay cash for her meds. SI/HI precautions reviewed.   -- Desired sterilization, but was not sterilized with her last delivery due to limited PNC, FTP signed 9/27/2019, but I did discuss that she won't have met the one month waiting period after signing FTP to complete a tubal ligation. Discussed options of interval tubal, IUD placed at the time of surgery, Nexplanon or depo provera placed during her postpartum hospitalization. Discussed side effects and bleeding profiles with each option, and she strongly desires placement of a Nexplanon within her hospitalization. Given her high risk of loss to follow-up and unintended pregnancy, I think this is warranted.   -- NST baseline 125, mod variability, +accels, no decels: Cat 1 reactive FHT. Rare contractions on tocometry.      Tanika Cifuentes MD  OB/GYN  9/30/2019       "

## 2019-09-30 NOTE — NURSING NOTE
"Initial /78 (BP Location: Right arm, Patient Position: Chair, Cuff Size: Adult Small)   Pulse 115   Temp 98.7  F (37.1  C) (Tympanic)   Resp 18   Ht 1.6 m (5' 3\")   Wt 62.1 kg (137 lb)   BMI 24.27 kg/m   Estimated body mass index is 24.27 kg/m  as calculated from the following:    Height as of this encounter: 1.6 m (5' 3\").    Weight as of this encounter: 62.1 kg (137 lb). .      "

## 2019-09-30 NOTE — TELEPHONE ENCOUNTER
"Courtney Alaniz  [id number]  1980    You are now scheduled for surgery at [facility].  Below are the details for your surgery.  Please read the \"Preparing for Your Surgery\" instructions and let us know if you have any questions.    Type of surgery: Repeat   Surgeon:  Tanika Cifuentes MD    Location of surgery: [facility] OR    Date of surgery: 10-4-19    Time: 12:00 noon   Arrival Time: 10:30am    Time can change, to be confirmed a couple of days prior by pre-op surgery nurse.    Pre-Op Appt Date: Patient to schedule with a PCP or Family Practice Provider within 30 days to the surgery.  Post-Op Appt Date: To be determined by provider     Packet sent out: Yes  Pre-cert/Authorization completed:  TBD by Financial Securing Office.   MA Sterilization/Hysterectomy Acknowledgment Consent signed: Not Applicable    [facility]  [phone]    Fax: [phone]  Same Day Surgery [phone]  Fax: [phone]  B[facility] Center [phone]      "

## 2019-10-01 ENCOUNTER — TELEPHONE (OUTPATIENT)
Dept: OBGYN | Facility: CLINIC | Age: 39
End: 2019-10-01

## 2019-10-01 NOTE — LETTER
[facility]  [address] [place] [address]  [phone]    2019      RE:Courtney Alaniz                                                                                                                            [address]  [place] [address]            To Whom It May Concern:      Courtney Alaniz ( 1980) is a patient here at [facility].  She is scheduled for a  on 10-4-19 at 12:00PM.        Sincerely,      Tanika Cifuentes MD

## 2019-10-01 NOTE — TELEPHONE ENCOUNTER
Reason for Call:  Other call back    Detailed comments: Requesting letter stating that she is having  on Friday and time.    Faxed to [phone] Attn Oscar Escalera    Letter typed and given to Tanika Cifuentes MD to sign then will fax as requested.    Phone Number Patient can be reached at: Cell number on file:    Telephone Information:   Mobile [phone]       Best Time: any    Can we leave a detailed message on this number? Not Applicable    Call taken on 10/1/2019 at 2:35 PM by Liz Renae

## 2019-10-03 ENCOUNTER — ANESTHESIA EVENT (OUTPATIENT)
Dept: SURGERY | Facility: CLINIC | Age: 39
End: 2019-10-03

## 2019-10-03 NOTE — ANESTHESIA PREPROCEDURE EVALUATION
Anesthesia Pre-Procedure Evaluation    Patient: Courtney Alaniz   MRN: [id number] : 1980          Preoperative Diagnosis: Previous  delivery, antepartum condition or complication [O34.219]    Procedure(s):  Repeat  section    Past Medical History:   Diagnosis Date     Chronic back pain      History of anemia      History of substance abuse (H)      Past Surgical History:   Procedure Laterality Date     BACK SURGERY      Age 15 for bone spur       SECTION      2018 for abruption        Anesthesia Evaluation     . Pt has had prior anesthetic. Type: General and Regional    No history of anesthetic complications          ROS/MED HX    ENT/Pulmonary:       Neurologic:  - neg neurologic ROS     Cardiovascular:  - neg cardiovascular ROS       METS/Exercise Tolerance:  >4 METS   Hematologic: Comments: Hgb = 8.2    (+) Anemia, -      Musculoskeletal: Comment: Back surgery age 15 for bone spur  (+)  other musculoskeletal- Lumbago      GI/Hepatic:  - neg GI/hepatic ROS       Renal/Genitourinary:  - ROS Renal section negative       Endo:  - neg endo ROS       Psychiatric:     (+) psychiatric history anxiety and depression (PTSD)      Infectious Disease:  - neg infectious disease ROS       Malignancy:      - no malignancy   Other: Comment: Hx of substance abuse  Previous C- section due to abruption  Polyhydramnios  Proteinuria  LE swelling  LLQ abd pain                         Physical Exam  Normal systems: cardiovascular, pulmonary and dental    Airway   Mallampati: I  TM distance: >3 FB  Neck ROM: full    Dental     Cardiovascular   Rhythm and rate: regular and normal      Pulmonary    breath sounds clear to auscultation            Lab Results   Component Value Date    WBC 11.1 (H) 2019    HGB 8.2 (L) 2019    HCT 28.1 (L) 2019     2019    CR 0.47 (L) 2019    ALT 11 2019    AST 15 2019    HCG NEGATIVE 09/15/2011       Preop Vitals  BP  "Readings from Last 3 Encounters:   09/30/19 127/78   09/27/19 114/71   04/09/14 127/90    Pulse Readings from Last 3 Encounters:   09/30/19 115   04/09/14 105      Resp Readings from Last 3 Encounters:   09/30/19 18   09/27/19 20    SpO2 Readings from Last 3 Encounters:   No data found for SpO2      Temp Readings from Last 1 Encounters:   09/30/19 37.1  C (98.7  F) (Tympanic)    Ht Readings from Last 1 Encounters:   09/30/19 1.6 m (5' 3\")      Wt Readings from Last 1 Encounters:   09/30/19 62.1 kg (137 lb)    Estimated body mass index is 24.27 kg/m  as calculated from the following:    Height as of 9/30/19: 1.6 m (5' 3\").    Weight as of 9/30/19: 62.1 kg (137 lb).       Anesthesia Plan      History & Physical Review  History and physical reviewed and following examination; no interval change.    ASA Status:  2 .    NPO Status:  > 8 hours    Plan for Spinal, For Post-op pain in coordination with surgeon and Peripheral Nerve Block   PONV prophylaxis:  Dexamethasone or Solumedrol and Ondansetron (or other 5HT-3)  SAB with transverse abdominis plane block      Postoperative Care  Postoperative pain management:  Multi-modal analgesia.      Consents  Anesthetic plan, risks, benefits and alternatives discussed with:  Patient..                 AMY Miranda CRNA  "

## 2019-10-04 ENCOUNTER — HOSPITAL ENCOUNTER (INPATIENT)
Facility: CLINIC | Age: 39
LOS: 3 days | Discharge: HOME | End: 2019-10-07
Attending: OBSTETRICS & GYNECOLOGY | Admitting: OBSTETRICS & GYNECOLOGY

## 2019-10-04 ENCOUNTER — ANESTHESIA (OUTPATIENT)
Dept: SURGERY | Facility: CLINIC | Age: 39
End: 2019-10-04

## 2019-10-04 DIAGNOSIS — F33.9 MAJOR DEPRESSIVE DISORDER, RECURRENT EPISODE (H): ICD-10-CM

## 2019-10-04 DIAGNOSIS — O34.219 PREVIOUS CESAREAN DELIVERY, ANTEPARTUM CONDITION OR COMPLICATION: ICD-10-CM

## 2019-10-04 DIAGNOSIS — F41.9 ANXIETY: ICD-10-CM

## 2019-10-04 PROBLEM — Z98.890 POSTOPERATIVE STATE: Status: ACTIVE | Noted: 2019-10-04

## 2019-10-04 LAB
ABO + RH BLD: NORMAL
ABO + RH BLD: NORMAL
AMPHETAMINES UR QL SCN: NEGATIVE
BARBITURATES UR QL: NEGATIVE
BENZODIAZ UR QL: NEGATIVE
BLD GP AB SCN SERPL QL: NORMAL
BLD PROD TYP BPU: NORMAL
BLOOD BANK CMNT PATIENT-IMP: NORMAL
CANNABINOIDS UR QL SCN: NEGATIVE
COCAINE UR QL: NEGATIVE
ERYTHROCYTE [DISTWIDTH] IN BLOOD BY AUTOMATED COUNT: 17 % (ref 10–15)
ERYTHROCYTE [DISTWIDTH] IN BLOOD BY AUTOMATED COUNT: 17 % (ref 10–15)
HCT VFR BLD AUTO: 27.3 % (ref 35–47)
HCT VFR BLD AUTO: 29.1 % (ref 35–47)
HGB BLD-MCNC: 7.9 G/DL (ref 11.7–15.7)
HGB BLD-MCNC: 8.4 G/DL (ref 11.7–15.7)
MCH RBC QN AUTO: 22.2 PG (ref 26.5–33)
MCH RBC QN AUTO: 22.5 PG (ref 26.5–33)
MCHC RBC AUTO-ENTMCNC: 28.9 G/DL (ref 31.5–36.5)
MCHC RBC AUTO-ENTMCNC: 28.9 G/DL (ref 31.5–36.5)
MCV RBC AUTO: 77 FL (ref 78–100)
MCV RBC AUTO: 78 FL (ref 78–100)
NUM BPU REQUESTED: 2
OPIATES UR QL SCN: NEGATIVE
PCP UR QL SCN: NEGATIVE
PLATELET # BLD AUTO: 191 10E9/L (ref 150–450)
PLATELET # BLD AUTO: 221 10E9/L (ref 150–450)
RBC # BLD AUTO: 3.51 10E12/L (ref 3.8–5.2)
RBC # BLD AUTO: 3.78 10E12/L (ref 3.8–5.2)
SPECIMEN EXP DATE BLD: NORMAL
WBC # BLD AUTO: 13.9 10E9/L (ref 4–11)
WBC # BLD AUTO: 7.5 10E9/L (ref 4–11)

## 2019-10-04 PROCEDURE — 25000132 ZZH RX MED GY IP 250 OP 250 PS 637

## 2019-10-04 PROCEDURE — 37000008 ZZH ANESTHESIA TECHNICAL FEE, 1ST 30 MIN: Performed by: OBSTETRICS & GYNECOLOGY

## 2019-10-04 PROCEDURE — 36000058 ZZH SURGERY LEVEL 3 EA 15 ADDTL MIN: Performed by: OBSTETRICS & GYNECOLOGY

## 2019-10-04 PROCEDURE — 71000012 ZZH RECOVERY PHASE 1 LEVEL 1 FIRST HR: Performed by: OBSTETRICS & GYNECOLOGY

## 2019-10-04 PROCEDURE — 36000056 ZZH SURGERY LEVEL 3 1ST 30 MIN: Performed by: OBSTETRICS & GYNECOLOGY

## 2019-10-04 PROCEDURE — 25800030 ZZH RX IP 258 OP 636: Performed by: NURSE ANESTHETIST, CERTIFIED REGISTERED

## 2019-10-04 PROCEDURE — 71000013 ZZH RECOVERY PHASE 1 LEVEL 1 EA ADDTL HR: Performed by: OBSTETRICS & GYNECOLOGY

## 2019-10-04 PROCEDURE — 59514 CESAREAN DELIVERY ONLY: CPT | Mod: AS | Performed by: PHYSICIAN ASSISTANT

## 2019-10-04 PROCEDURE — 0UB50ZZ EXCISION OF RIGHT FALLOPIAN TUBE, OPEN APPROACH: ICD-10-PCS | Performed by: OBSTETRICS & GYNECOLOGY

## 2019-10-04 PROCEDURE — 25000128 H RX IP 250 OP 636: Performed by: NURSE ANESTHETIST, CERTIFIED REGISTERED

## 2019-10-04 PROCEDURE — 86923 COMPATIBILITY TEST ELECTRIC: CPT | Performed by: OBSTETRICS & GYNECOLOGY

## 2019-10-04 PROCEDURE — 36415 COLL VENOUS BLD VENIPUNCTURE: CPT | Performed by: OBSTETRICS & GYNECOLOGY

## 2019-10-04 PROCEDURE — 86850 RBC ANTIBODY SCREEN: CPT | Performed by: OBSTETRICS & GYNECOLOGY

## 2019-10-04 PROCEDURE — 25000128 H RX IP 250 OP 636: Performed by: OBSTETRICS & GYNECOLOGY

## 2019-10-04 PROCEDURE — 25000132 ZZH RX MED GY IP 250 OP 250 PS 637: Performed by: OBSTETRICS & GYNECOLOGY

## 2019-10-04 PROCEDURE — 85027 COMPLETE CBC AUTOMATED: CPT | Performed by: OBSTETRICS & GYNECOLOGY

## 2019-10-04 PROCEDURE — 88305 TISSUE EXAM BY PATHOLOGIST: CPT | Performed by: OBSTETRICS & GYNECOLOGY

## 2019-10-04 PROCEDURE — 37000009 ZZH ANESTHESIA TECHNICAL FEE, EACH ADDTL 15 MIN: Performed by: OBSTETRICS & GYNECOLOGY

## 2019-10-04 PROCEDURE — 59515 CESAREAN DELIVERY: CPT | Performed by: OBSTETRICS & GYNECOLOGY

## 2019-10-04 PROCEDURE — 80307 DRUG TEST PRSMV CHEM ANLYZR: CPT | Performed by: OBSTETRICS & GYNECOLOGY

## 2019-10-04 PROCEDURE — C9290 INJ, BUPIVACAINE LIPOSOME: HCPCS | Performed by: NURSE ANESTHETIST, CERTIFIED REGISTERED

## 2019-10-04 PROCEDURE — 86900 BLOOD TYPING SEROLOGIC ABO: CPT | Performed by: OBSTETRICS & GYNECOLOGY

## 2019-10-04 PROCEDURE — 12000000 ZZH R&B MED SURG/OB

## 2019-10-04 PROCEDURE — 27210794 ZZH OR GENERAL SUPPLY STERILE: Performed by: OBSTETRICS & GYNECOLOGY

## 2019-10-04 PROCEDURE — 88305 TISSUE EXAM BY PATHOLOGIST: CPT | Mod: 26 | Performed by: OBSTETRICS & GYNECOLOGY

## 2019-10-04 PROCEDURE — 25000125 ZZHC RX 250: Performed by: NURSE ANESTHETIST, CERTIFIED REGISTERED

## 2019-10-04 PROCEDURE — 86901 BLOOD TYPING SEROLOGIC RH(D): CPT | Performed by: OBSTETRICS & GYNECOLOGY

## 2019-10-04 RX ORDER — BUPIVACAINE HYDROCHLORIDE 2.5 MG/ML
INJECTION, SOLUTION EPIDURAL; INFILTRATION; INTRACAUDAL PRN
Status: DISCONTINUED | OUTPATIENT
Start: 2019-10-04 | End: 2019-10-04

## 2019-10-04 RX ORDER — ACETAMINOPHEN 325 MG/1
650 TABLET ORAL EVERY 4 HOURS PRN
Status: DISCONTINUED | OUTPATIENT
Start: 2019-10-07 | End: 2019-10-07 | Stop reason: HOSPADM

## 2019-10-04 RX ORDER — FENTANYL CITRATE 50 UG/ML
INJECTION, SOLUTION INTRAMUSCULAR; INTRAVENOUS PRN
Status: DISCONTINUED | OUTPATIENT
Start: 2019-10-04 | End: 2019-10-04

## 2019-10-04 RX ORDER — HYDROCORTISONE 2.5 %
CREAM (GRAM) TOPICAL 3 TIMES DAILY PRN
Status: DISCONTINUED | OUTPATIENT
Start: 2019-10-04 | End: 2019-10-07 | Stop reason: HOSPADM

## 2019-10-04 RX ORDER — OXYCODONE HYDROCHLORIDE 5 MG/1
5-10 TABLET ORAL
Status: DISCONTINUED | OUTPATIENT
Start: 2019-10-04 | End: 2019-10-07 | Stop reason: HOSPADM

## 2019-10-04 RX ORDER — LIDOCAINE HYDROCHLORIDE 10 MG/ML
INJECTION, SOLUTION EPIDURAL; INFILTRATION; INTRACAUDAL; PERINEURAL PRN
Status: DISCONTINUED | OUTPATIENT
Start: 2019-10-04 | End: 2019-10-04

## 2019-10-04 RX ORDER — DEXTROSE, SODIUM CHLORIDE, SODIUM LACTATE, POTASSIUM CHLORIDE, AND CALCIUM CHLORIDE 5; .6; .31; .03; .02 G/100ML; G/100ML; G/100ML; G/100ML; G/100ML
INJECTION, SOLUTION INTRAVENOUS CONTINUOUS
Status: DISCONTINUED | OUTPATIENT
Start: 2019-10-04 | End: 2019-10-07 | Stop reason: HOSPADM

## 2019-10-04 RX ORDER — CITRIC ACID/SODIUM CITRATE 334-500MG
SOLUTION, ORAL ORAL
Status: COMPLETED
Start: 2019-10-04 | End: 2019-10-04

## 2019-10-04 RX ORDER — KETOROLAC TROMETHAMINE 30 MG/ML
INJECTION, SOLUTION INTRAMUSCULAR; INTRAVENOUS PRN
Status: DISCONTINUED | OUTPATIENT
Start: 2019-10-04 | End: 2019-10-04

## 2019-10-04 RX ORDER — CITRIC ACID/SODIUM CITRATE 334-500MG
30 SOLUTION, ORAL ORAL
Status: DISCONTINUED | OUTPATIENT
Start: 2019-10-04 | End: 2019-10-07 | Stop reason: HOSPADM

## 2019-10-04 RX ORDER — VENLAFAXINE HYDROCHLORIDE 75 MG/1
75 CAPSULE, EXTENDED RELEASE ORAL DAILY
Status: DISCONTINUED | OUTPATIENT
Start: 2019-10-04 | End: 2019-10-07 | Stop reason: HOSPADM

## 2019-10-04 RX ORDER — ACETAMINOPHEN 325 MG/1
975 TABLET ORAL EVERY 8 HOURS
Status: DISPENSED | OUTPATIENT
Start: 2019-10-04 | End: 2019-10-07

## 2019-10-04 RX ORDER — IBUPROFEN 800 MG/1
800 TABLET, FILM COATED ORAL EVERY 6 HOURS PRN
Status: DISCONTINUED | OUTPATIENT
Start: 2019-10-04 | End: 2019-10-04

## 2019-10-04 RX ORDER — SODIUM CHLORIDE, SODIUM LACTATE, POTASSIUM CHLORIDE, CALCIUM CHLORIDE 600; 310; 30; 20 MG/100ML; MG/100ML; MG/100ML; MG/100ML
INJECTION, SOLUTION INTRAVENOUS CONTINUOUS PRN
Status: DISCONTINUED | OUTPATIENT
Start: 2019-10-04 | End: 2019-10-04

## 2019-10-04 RX ORDER — PHENYLEPHRINE HYDROCHLORIDE 10 MG/ML
INJECTION INTRAVENOUS PRN
Status: DISCONTINUED | OUTPATIENT
Start: 2019-10-04 | End: 2019-10-04

## 2019-10-04 RX ORDER — BISACODYL 10 MG
10 SUPPOSITORY, RECTAL RECTAL DAILY PRN
Status: DISCONTINUED | OUTPATIENT
Start: 2019-10-06 | End: 2019-10-07 | Stop reason: HOSPADM

## 2019-10-04 RX ORDER — LANOLIN 100 %
OINTMENT (GRAM) TOPICAL
Status: DISCONTINUED | OUTPATIENT
Start: 2019-10-04 | End: 2019-10-07 | Stop reason: HOSPADM

## 2019-10-04 RX ORDER — OXYTOCIN 10 [USP'U]/ML
10 INJECTION, SOLUTION INTRAMUSCULAR; INTRAVENOUS
Status: DISCONTINUED | OUTPATIENT
Start: 2019-10-04 | End: 2019-10-07 | Stop reason: HOSPADM

## 2019-10-04 RX ORDER — OXYTOCIN 10 [USP'U]/ML
INJECTION, SOLUTION INTRAMUSCULAR; INTRAVENOUS PRN
Status: DISCONTINUED | OUTPATIENT
Start: 2019-10-04 | End: 2019-10-04

## 2019-10-04 RX ORDER — AMOXICILLIN 250 MG
1 CAPSULE ORAL 2 TIMES DAILY PRN
Status: DISCONTINUED | OUTPATIENT
Start: 2019-10-04 | End: 2019-10-07 | Stop reason: HOSPADM

## 2019-10-04 RX ORDER — CEFAZOLIN SODIUM 2 G/100ML
2 INJECTION, SOLUTION INTRAVENOUS
Status: COMPLETED | OUTPATIENT
Start: 2019-10-04 | End: 2019-10-04

## 2019-10-04 RX ORDER — ONDANSETRON 2 MG/ML
4 INJECTION INTRAMUSCULAR; INTRAVENOUS EVERY 6 HOURS PRN
Status: DISCONTINUED | OUTPATIENT
Start: 2019-10-04 | End: 2019-10-07 | Stop reason: HOSPADM

## 2019-10-04 RX ORDER — BUPIVACAINE HYDROCHLORIDE 7.5 MG/ML
INJECTION, SOLUTION INTRASPINAL PRN
Status: DISCONTINUED | OUTPATIENT
Start: 2019-10-04 | End: 2019-10-04

## 2019-10-04 RX ORDER — OXYTOCIN/0.9 % SODIUM CHLORIDE 30/500 ML
100 PLASTIC BAG, INJECTION (ML) INTRAVENOUS CONTINUOUS
Status: DISCONTINUED | OUTPATIENT
Start: 2019-10-04 | End: 2019-10-07 | Stop reason: HOSPADM

## 2019-10-04 RX ORDER — SODIUM CHLORIDE, SODIUM LACTATE, POTASSIUM CHLORIDE, CALCIUM CHLORIDE 600; 310; 30; 20 MG/100ML; MG/100ML; MG/100ML; MG/100ML
INJECTION, SOLUTION INTRAVENOUS CONTINUOUS
Status: DISCONTINUED | OUTPATIENT
Start: 2019-10-04 | End: 2019-10-04

## 2019-10-04 RX ORDER — LIDOCAINE 40 MG/G
CREAM TOPICAL
Status: DISCONTINUED | OUTPATIENT
Start: 2019-10-04 | End: 2019-10-07 | Stop reason: HOSPADM

## 2019-10-04 RX ORDER — OXYTOCIN/0.9 % SODIUM CHLORIDE 30/500 ML
PLASTIC BAG, INJECTION (ML) INTRAVENOUS PRN
Status: DISCONTINUED | OUTPATIENT
Start: 2019-10-04 | End: 2019-10-04

## 2019-10-04 RX ORDER — SIMETHICONE 80 MG
80 TABLET,CHEWABLE ORAL 4 TIMES DAILY PRN
Status: DISCONTINUED | OUTPATIENT
Start: 2019-10-04 | End: 2019-10-07 | Stop reason: HOSPADM

## 2019-10-04 RX ORDER — EPINEPHRINE 1 MG/ML
INJECTION, SOLUTION, CONCENTRATE INTRAVENOUS PRN
Status: DISCONTINUED | OUTPATIENT
Start: 2019-10-04 | End: 2019-10-04

## 2019-10-04 RX ORDER — AMOXICILLIN 250 MG
2 CAPSULE ORAL 2 TIMES DAILY PRN
Status: DISCONTINUED | OUTPATIENT
Start: 2019-10-04 | End: 2019-10-07 | Stop reason: HOSPADM

## 2019-10-04 RX ORDER — ONDANSETRON 2 MG/ML
INJECTION INTRAMUSCULAR; INTRAVENOUS PRN
Status: DISCONTINUED | OUTPATIENT
Start: 2019-10-04 | End: 2019-10-04

## 2019-10-04 RX ORDER — LIDOCAINE 40 MG/G
CREAM TOPICAL
Status: DISCONTINUED | OUTPATIENT
Start: 2019-10-04 | End: 2019-10-04

## 2019-10-04 RX ORDER — OXYTOCIN/0.9 % SODIUM CHLORIDE 30/500 ML
340 PLASTIC BAG, INJECTION (ML) INTRAVENOUS CONTINUOUS PRN
Status: DISCONTINUED | OUTPATIENT
Start: 2019-10-04 | End: 2019-10-07 | Stop reason: HOSPADM

## 2019-10-04 RX ORDER — KETOROLAC TROMETHAMINE 30 MG/ML
30 INJECTION, SOLUTION INTRAMUSCULAR; INTRAVENOUS EVERY 6 HOURS
Status: COMPLETED | OUTPATIENT
Start: 2019-10-04 | End: 2019-10-05

## 2019-10-04 RX ORDER — PHENAZOPYRIDINE HYDROCHLORIDE 200 MG/1
200 TABLET, FILM COATED ORAL ONCE
Status: DISCONTINUED | OUTPATIENT
Start: 2019-10-04 | End: 2019-10-04

## 2019-10-04 RX ORDER — CEFAZOLIN SODIUM 1 G/50ML
1 INJECTION, SOLUTION INTRAVENOUS SEE ADMIN INSTRUCTIONS
Status: DISCONTINUED | OUTPATIENT
Start: 2019-10-04 | End: 2019-10-04

## 2019-10-04 RX ORDER — NALOXONE HYDROCHLORIDE 0.4 MG/ML
.1-.4 INJECTION, SOLUTION INTRAMUSCULAR; INTRAVENOUS; SUBCUTANEOUS
Status: DISCONTINUED | OUTPATIENT
Start: 2019-10-04 | End: 2019-10-07 | Stop reason: HOSPADM

## 2019-10-04 RX ORDER — MORPHINE SULFATE 1 MG/ML
INJECTION, SOLUTION EPIDURAL; INTRATHECAL; INTRAVENOUS PRN
Status: DISCONTINUED | OUTPATIENT
Start: 2019-10-04 | End: 2019-10-04

## 2019-10-04 RX ORDER — OXYTOCIN/0.9 % SODIUM CHLORIDE 30/500 ML
PLASTIC BAG, INJECTION (ML) INTRAVENOUS
Status: DISCONTINUED
Start: 2019-10-04 | End: 2019-10-04 | Stop reason: HOSPADM

## 2019-10-04 RX ORDER — IBUPROFEN 800 MG/1
800 TABLET, FILM COATED ORAL EVERY 6 HOURS PRN
Status: DISCONTINUED | OUTPATIENT
Start: 2019-10-05 | End: 2019-10-07 | Stop reason: HOSPADM

## 2019-10-04 RX ADMIN — SODIUM CITRATE AND CITRIC ACID MONOHYDRATE 30 ML: 500; 334 SOLUTION ORAL at 12:02

## 2019-10-04 RX ADMIN — BUPIVACAINE HYDROCHLORIDE IN DEXTROSE 10.5 MG: 7.5 INJECTION, SOLUTION SUBARACHNOID at 12:12

## 2019-10-04 RX ADMIN — BUPIVACAINE 10 ML: 13.3 INJECTION, SUSPENSION, LIPOSOMAL INFILTRATION at 13:10

## 2019-10-04 RX ADMIN — ACETAMINOPHEN 975 MG: 325 TABLET, FILM COATED ORAL at 16:37

## 2019-10-04 RX ADMIN — SODIUM CHLORIDE, POTASSIUM CHLORIDE, SODIUM LACTATE AND CALCIUM CHLORIDE: 600; 310; 30; 20 INJECTION, SOLUTION INTRAVENOUS at 11:38

## 2019-10-04 RX ADMIN — ONDANSETRON 4 MG: 2 INJECTION INTRAMUSCULAR; INTRAVENOUS at 12:15

## 2019-10-04 RX ADMIN — BUPIVACAINE HYDROCHLORIDE 10 ML: 2.5 INJECTION, SOLUTION EPIDURAL; INFILTRATION; INTRACAUDAL at 13:10

## 2019-10-04 RX ADMIN — BUPIVACAINE HYDROCHLORIDE 10 ML: 2.5 INJECTION, SOLUTION EPIDURAL; INFILTRATION; INTRACAUDAL at 13:05

## 2019-10-04 RX ADMIN — FENTANYL CITRATE 25 MCG: 50 INJECTION, SOLUTION INTRAMUSCULAR; INTRAVENOUS at 12:12

## 2019-10-04 RX ADMIN — CEFAZOLIN SODIUM 2 G: 2 INJECTION, SOLUTION INTRAVENOUS at 12:02

## 2019-10-04 RX ADMIN — EPINEPHRINE 0.2 MG: 1 INJECTION, SOLUTION INTRAMUSCULAR; SUBCUTANEOUS at 12:12

## 2019-10-04 RX ADMIN — SODIUM CHLORIDE, POTASSIUM CHLORIDE, SODIUM LACTATE AND CALCIUM CHLORIDE: 600; 310; 30; 20 INJECTION, SOLUTION INTRAVENOUS at 12:06

## 2019-10-04 RX ADMIN — KETOROLAC TROMETHAMINE 30 MG: 30 INJECTION, SOLUTION INTRAMUSCULAR at 12:35

## 2019-10-04 RX ADMIN — Medication 300 ML: at 12:31

## 2019-10-04 RX ADMIN — MORPHINE SULFATE 0.25 MG: 1 INJECTION, SOLUTION EPIDURAL; INTRATHECAL; INTRAVENOUS at 12:12

## 2019-10-04 RX ADMIN — VENLAFAXINE HYDROCHLORIDE 75 MG: 75 CAPSULE, EXTENDED RELEASE ORAL at 16:38

## 2019-10-04 RX ADMIN — PHENYLEPHRINE HYDROCHLORIDE 100 MCG: 10 INJECTION INTRAVENOUS at 12:16

## 2019-10-04 RX ADMIN — KETOROLAC TROMETHAMINE 30 MG: 30 INJECTION, SOLUTION INTRAMUSCULAR at 18:52

## 2019-10-04 RX ADMIN — PHENYLEPHRINE HYDROCHLORIDE 1 MCG/KG/MIN: 10 INJECTION INTRAVENOUS at 12:13

## 2019-10-04 RX ADMIN — LIDOCAINE HYDROCHLORIDE 3 ML: 10 INJECTION, SOLUTION EPIDURAL; INFILTRATION; INTRACAUDAL; PERINEURAL at 12:11

## 2019-10-04 RX ADMIN — OXYTOCIN 5 UNITS: 10 INJECTION, SOLUTION INTRAMUSCULAR; INTRAVENOUS at 12:31

## 2019-10-04 RX ADMIN — FENTANYL CITRATE 75 MCG: 50 INJECTION, SOLUTION INTRAMUSCULAR; INTRAVENOUS at 12:35

## 2019-10-04 RX ADMIN — BUPIVACAINE 10 ML: 13.3 INJECTION, SUSPENSION, LIPOSOMAL INFILTRATION at 13:05

## 2019-10-04 NOTE — ANESTHESIA PROCEDURE NOTES
Peripheral nerve/Neuraxial procedure note : intrathecal  Pre-Procedure  Performed by  Jatinder Rene, in the presence of a teaching physician  Nilson Mckee APRN CRNA  Location: OR      Pre-Anesthestic Checklist: patient identified, IV checked, risks and benefits discussed, informed consent, monitors and equipment checked and pre-op evaluation    Timeout  Correct Patient: Yes   Correct Procedure: Yes   Correct Site: Yes   Correct Laterality: N/A   Correct Position: Yes   Site Marked: N/A   .   Procedure Documentation  ASA 2  .    Procedure: intrathecal, .   Patient Position:sitting Insertion Site:L3-4  (midline approach)     Patient Prep/Sterile Barriers; mask, sterile gloves, povidone-iodine 7.5% surgical scrub, patient draped.  .  Needle:  Spinal Needle (gauge): 25  # of attempts: 1 and # of redirects:  Introducer used .        Assessment/Narrative  .  .  clear CSF fluid removed . Sensory Level: T6

## 2019-10-04 NOTE — ANESTHESIA PROCEDURE NOTES
Peripheral nerve/Neuraxial procedure note : TAP  Pre-Procedure  Performed by  Jatinder Rene, in the presence of a teaching physician  Nilson Mckee APRN CRNA  Location: OR    Procedure Times:10/4/2019 1:00 PM and 10/4/2019 1:10 PM  Pre-Anesthestic Checklist: patient identified, IV checked, risks and benefits discussed, informed consent, monitors and equipment checked, pre-op evaluation and post-op pain management    Timeout  Correct Patient: Yes   Correct Procedure: Yes   Correct Site: Yes   Correct Laterality: N/A   Correct Position: Yes   Site Marked: N/A   .   Procedure Documentation    .    Procedure: TAP, bilateral.   Patient Position:supine   Ultrasound used to identify targeted nerve, plexus, or vascular marker and placed a needle adjacent to it., Ultrasound was used to visualize the spread of the anesthetic in close proximity to the above stated nerve. A permanent image is entered into the patient's record.  Patient Prep/Sterile Barriers; mask, sterile gloves, chlorhexidine gluconate and isopropyl alcohol, patient draped.  .  Needle: insulated, short bevel   Needle Gauge: 17.    Needle Length (Inches) 3.13   Insertion Method: Single Shot.        Assessment/Narrative  Paresthesias: No.  .  The placement was negative for: blood aspirated and painful injection.  Bolus given via needle..   Secured via.   Complications: none.

## 2019-10-04 NOTE — H&P
[facility] Labor and Delivery H&P  2019  Courtney Alaniz  [id number]      HPI: Courtney Alaniz is a 39 year old  at 40w0d by LMP c/w 39w0d US, here for scheduled repeat C/S. Feeling well. No signs/symptoms of labor. Active baby.      Pregnancy notable for:  --No PNC  --Hx of  delivery for abruption, planning repeat  --Polyhydramnios: mild, normal  testing, plan made for delivery with MFM at 40wks due to poor obstetrical dating  --Hx of polysubstance use: normal UDS   --Hx of anemia: Hgb 8.2, T&C and 2U pRBCs held, repeat CBC pending   --Depression/anxiety: no meds  --Desires sterilization: FTP signed 2019, but will need interval tubal ligation due regulations with state sponsored health insurance, plan for Nexplanon placement on postpartum     OBHX:   OB History    Para Term  AB Living   8 3 2 0 4 3   SAB TAB Ectopic Multiple Live Births   2 2 0 0 3      # Outcome Date GA Lbr Demarco/2nd Weight Sex Delivery Anes PTL Lv   8 Current            7 Term 18 39w0d  3.742 kg (8 lb 4 oz) F CS-LTranv   WOODY   6 SAB 01/01/15        FD   5 SAB 14        FD   4 TAB 05        FD   3 TAB 03        FD   2 Para 02 40w1d  3.742 kg (8 lb 4 oz)  Vag-Spont   WOODY   1 Term 00 39w0d  2.977 kg (6 lb 9 oz)  Vag-Spont   WOODY       MedicalHX:   Past Medical History:   Diagnosis Date     Chronic back pain      History of anemia      History of substance abuse (H)        SurgicalHX:   Past Surgical History:   Procedure Laterality Date     BACK SURGERY      Age 15 for bone spur       SECTION      2018 for abruption        Medications:   No current facility-administered medications on file prior to encounter.   hydrOXYzine (ATARAX) 25 MG tablet, Take 1-2 tablets (25-50 mg) by mouth every 6 hours as needed for anxiety (Patient not taking: Reported on 2019)  venlafaxine (EFFEXOR-XR) 75 MG 24 hr capsule, Take 1 capsule (75 mg) by mouth daily  Needs to be seen before further refills (Patient not taking: Reported on 9/30/2019)        Allergies:  Allergies   Allergen Reactions     Nkda [No Known Drug Allergies]        FamilyHX:  Family History   Problem Relation Age of Onset     Depression Mother      Unknown/Adopted Father      Depression Brother      Unknown/Adopted Maternal Grandmother      Unknown/Adopted Maternal Grandfather        SocialHX:   Social History     Socioeconomic History     Marital status: Single     Spouse name: Not on file     Number of children: Not on file     Years of education: Not on file     Highest education level: Not on file   Occupational History     Not on file   Social Needs     Financial resource strain: Not on file     Food insecurity:     Worry: Not on file     Inability: Not on file     Transportation needs:     Medical: Not on file     Non-medical: Not on file   Tobacco Use     Smoking status: Never Smoker     Smokeless tobacco: Never Used   Substance and Sexual Activity     Alcohol use: Yes     Comment: quit with pregnancy     Drug use: No     Sexual activity: Yes     Partners: Male   Lifestyle     Physical activity:     Days per week: Not on file     Minutes per session: Not on file     Stress: Not on file   Relationships     Social connections:     Talks on phone: Not on file     Gets together: Not on file     Attends Restorationism service: Not on file     Active member of club or organization: Not on file     Attends meetings of clubs or organizations: Not on file     Relationship status: Not on file     Intimate partner violence:     Fear of current or ex partner: Not on file     Emotionally abused: Not on file     Physically abused: Not on file     Forced sexual activity: Not on file   Other Topics Concern     Parent/sibling w/ CABG, MI or angioplasty before 65F 55M? Not Asked   Social History Narrative     Not on file       ROS: 10-point ROS negative except as in HPI     Physical Exam:  There were no vitals filed for  this visit.  GEN: resting comfortably in bed, NAD   CV: RRR, no murmurs  PULM: CTAB, no increased work of breathing, no cough/wheeze   ABD: soft, gravid, non-tender, non-distended  EXT: no edema, nontender to palpation  Presentation: cephalic by Leopold's  EFW: 8 lbs  Membranes: intact    NST: Cat 1 reactive     Ultrasounds:  Dating US: 39w0d, normal anatomy, mild polyp      Labs:    Lab Results   Component Value Date    ABO O 2019    RH Pos 2019    AS Neg 2019    HEPBANG Nonreactive 2019    CHPCRT Negative 2019    GCPCRT Negative 2019    HGB 8.2 (L) 2019       GBS Status:   Lab Results   Component Value Date    GBS Negative 2019       A/P: Courtney Alaniz is a 39 year old female  at 40w0d by LMP c/w 39wk US, here for scheduled repeat  delivery.     Pregnancy complications:   --No PNC  --Hx of  delivery for abruption, planning repeat  --Polyhydramnios: mild, normal  testing, plan made for delivery with MFM at 40wks due to poor obstetrical dating  --Hx of polysubstance use: normal UDS   --Hx of anemia: Hgb 8.2, T&C and 2U pRBCs held, repeat CBC pending   --Depression/anxiety: no meds  --Desires sterilization: FTP signed 2019, but will need interval tubal ligation due regulations with state sponsored health insurance, plan for Nexplanon placement on postpartum   Admit to L&D. Place PIV. Draw labs: CBC, RPR.     FWB: Cat 1 reactive NST    Tanika Cifuentes MD  OB/GYN

## 2019-10-04 NOTE — ANESTHESIA POSTPROCEDURE EVALUATION
Patient: Courtney Alaniz    Procedure(s):  Repeat  section    Diagnosis:Previous  delivery, antepartum condition or complication [O34.219]  Diagnosis Additional Information: No value filed.    Anesthesia Type:  Spinal, For Post-op pain in coordination with surgeon, Peripheral Nerve Block    Note:  Anesthesia Post Evaluation    Patient location during evaluation: OB PACU  Patient participation: Able to participate in evaluation but full recovery from regional anesthesia has not yet ocurrred but is anticipated to occur within 48 hours  Level of consciousness: awake and alert  Pain management: adequate  Airway patency: patent  Cardiovascular status: acceptable and hemodynamically stable  Respiratory status: acceptable and room air  Hydration status: acceptable  PONV: none     Anesthetic complications: None          Last vitals:  Vitals:    10/04/19 1329   BP: (P) 101/67   Pulse: (P) 72   Resp: (P) 18   Temp: (P) 36.4  C (97.6  F)         Electronically Signed By: AMY Miranda CRNA  2019  2:07 PM

## 2019-10-04 NOTE — ANESTHESIA CARE TRANSFER NOTE
Patient: Courtney Alaniz    Procedure(s):  Repeat  section    Diagnosis: Previous  delivery, antepartum condition or complication [O34.219]  Diagnosis Additional Information: No value filed.    Anesthesia Type:   Spinal, For Post-op pain in coordination with surgeon, Peripheral Nerve Block     Note:  Airway :Room Air  Patient transferred to:PACU  Handoff Report: Identifed the Patient, Identified the Reponsible Provider, Reviewed the pertinent medical history, Discussed the surgical course, Reviewed Intra-OP anesthesia mangement and issues during anesthesia, Set expectations for post-procedure period and Allowed opportunity for questions and acknowledgement of understanding      Vitals: (Last set prior to Anesthesia Care Transfer)    CRNA VITALS  10/4/2019 1245 - 10/4/2019 1316      10/4/2019             Pulse:  82    SpO2:  100 %                Electronically Signed By: Jatinder Rene  2019  1:16 PM

## 2019-10-05 LAB — HGB BLD-MCNC: 7.7 G/DL (ref 11.7–15.7)

## 2019-10-05 PROCEDURE — 25000132 ZZH RX MED GY IP 250 OP 250 PS 637: Performed by: OBSTETRICS & GYNECOLOGY

## 2019-10-05 PROCEDURE — 85018 HEMOGLOBIN: CPT | Performed by: OBSTETRICS & GYNECOLOGY

## 2019-10-05 PROCEDURE — 25000128 H RX IP 250 OP 636: Performed by: OBSTETRICS & GYNECOLOGY

## 2019-10-05 PROCEDURE — 36415 COLL VENOUS BLD VENIPUNCTURE: CPT | Performed by: OBSTETRICS & GYNECOLOGY

## 2019-10-05 PROCEDURE — 12000000 ZZH R&B MED SURG/OB

## 2019-10-05 RX ADMIN — KETOROLAC TROMETHAMINE 30 MG: 30 INJECTION, SOLUTION INTRAMUSCULAR at 09:25

## 2019-10-05 RX ADMIN — ACETAMINOPHEN 975 MG: 325 TABLET, FILM COATED ORAL at 17:03

## 2019-10-05 RX ADMIN — SENNOSIDES AND DOCUSATE SODIUM 1 TABLET: 8.6; 5 TABLET ORAL at 20:15

## 2019-10-05 RX ADMIN — KETOROLAC TROMETHAMINE 30 MG: 30 INJECTION, SOLUTION INTRAMUSCULAR at 12:28

## 2019-10-05 RX ADMIN — IBUPROFEN 800 MG: 800 TABLET ORAL at 20:15

## 2019-10-05 RX ADMIN — ACETAMINOPHEN 975 MG: 325 TABLET, FILM COATED ORAL at 09:24

## 2019-10-05 RX ADMIN — KETOROLAC TROMETHAMINE 30 MG: 30 INJECTION, SOLUTION INTRAMUSCULAR at 01:20

## 2019-10-05 RX ADMIN — VENLAFAXINE HYDROCHLORIDE 75 MG: 75 CAPSULE, EXTENDED RELEASE ORAL at 09:24

## 2019-10-05 RX ADMIN — ACETAMINOPHEN 975 MG: 325 TABLET, FILM COATED ORAL at 01:20

## 2019-10-05 ASSESSMENT — ACTIVITIES OF DAILY LIVING (ADL)
FALL_HISTORY_WITHIN_LAST_SIX_MONTHS: NO
AMBULATION: 0-->INDEPENDENT
TOILETING: 0-->INDEPENDENT
RETIRED_COMMUNICATION: 0-->UNDERSTANDS/COMMUNICATES WITHOUT DIFFICULTY
BATHING: 0-->INDEPENDENT
COGNITION: 0 - NO COGNITION ISSUES REPORTED
RETIRED_EATING: 0-->INDEPENDENT
SWALLOWING: 0-->SWALLOWS FOODS/LIQUIDS WITHOUT DIFFICULTY
DRESS: 0-->INDEPENDENT
TRANSFERRING: 0-->INDEPENDENT

## 2019-10-05 NOTE — PROGRESS NOTES
Pt up with SBA.  Pt dangled and stood at bedside.  Pt tolerated well.  Janice care completed per provider.  Pt ambulated in room to chair.  Pt instructed to call RN for assistance in getting back to bed.

## 2019-10-05 NOTE — PROGRESS NOTES
CTS note. Call received from birth place that a cord tissue segment was sent for drug detection panel. CTS to follow for results.
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
## 2019-10-05 NOTE — PROGRESS NOTES
[facility] OB/GYN Daily Postpartum Note    S: Courtney Alaniz is feeling well this morning. She denies any complaints. Her pain is well-controlled on current pain medications. She tolerating a regular diet without nausea or vomiting. Ambulating without difficulty. Lochia is decreasing. Bottlefeedingwithout questions or concerns. Machado remains in place. She plans to use Nexplanon > tubal for contraception.      O:   /75   Pulse 79   Temp 98.5  F (36.9  C) (Oral)   Resp 16   LMP 2018   SpO2 99%   Breastfeeding? Unknown      I/O last 3 completed shifts:  In: 900 [I.V.:900]  Out: 1346 [Urine:825; Blood:521]    General: resting in bed, in NAD  CV: reg rate, well perfused  Resp: no increased work of breathing  Abdomen: soft, appropriately tender, nondistended  Fundus firm below the umbilicus  Incision: clean, dry, intact  Extremities: non-tender, non-edematous   Psych: normal mood and affect    Recent Labs   Lab 10/05/19  0556 10/04/19  1759 10/04/19  1141   HGB 7.7* 7.9* 8.4*       A: Courtney Alaniz is a 39 year old  who is POD#1 s/p RLTCS, doing well this morning.     P:  Continue routine pp cares  Heme 8.4 >  > 7.7 -- asymptomatic this AM so will not plan to transfuse, patient is aware that should she become symptomatic, would recommend transfusion and is agreeable  PSA: social work consulted, UDS on admit normal   GI: tolerating regular diet  Feeding: bottle feeding  Contraception: Nexplanon > tubal   Rh positive, Rubella Immune  Disposition: routine PP cares, anticipate d/c tomorrow or Monday, once discharge goals are obtained    Recommend close follow up within 2-3 days postpartum for mood check and Nexplanon placement     Silvia Azevedo MD, MD  [facility]   10/5/2019 9:22 AM

## 2019-10-05 NOTE — DISCHARGE INSTRUCTIONS
Postop  Birth Instructions  MAKE APPOINTMENT TO FOLLOW UP WITH YOUR DOCTOR IN 6 WEEKS, SOONER IF PHYSICAL OR EMOTIONAL CONCERNS.     Activity       Do not lift more than 10 pounds for 6 weeks after surgery.  Ask family and friends for help when you need it.    No driving until you have stopped taking your pain medications (usually two weeks after surgery).    No heavy exercise or activity for 6 weeks.  Don't do anything that will put a strain on your surgery site.    Don't strain when using the toilet.  Your care team may prescribe a stool softener if you have problems with your bowel movements.     To care for your incision:       Keep the incision clean and dry.    Do not soak your incision in water. No swimming or hot tubs until it has fully healed. You may soak in the bathtub if the water level is below your incision.    Do not use peroxide, gel, cream, lotion, or ointment on your incision.    Adjust your clothes to avoid pressure on your surgery site (check the elastic in your underwear for example).     You may see a small amount of clear or pink drainage and this is normal.  Check with your health care provider:       If the drainage increases or has an odor.    If the incision reddens, you have swelling, or develop a rash.    If you have increased pain and the medicine we prescribed doesn't help.    If you have a fever above 100.4 F (38 C) with or without chills when placing thermometer under your tongue.   The area around your incision (surgery wound), will feel numb.  This is normal. The numbness should go away in less than a year.     Keep your hands clean:  Always wash your hands before touching your incision (surgery wound). This helps reduce your risk of infection. If your hands aren't dirty, you may use an alcohol hand-rub to clean your hands. Keep your nails clean and short.    Call your healthcare provider if you have any of these symptoms:       You soak a sanitary pad with blood within 1  hour, or you see blood clots larger than a golf ball.    Bleeding that lasts more than 6 weeks.    Vaginal discharge that smells bad.    Severe pain, cramping or tenderness in your lower belly area.    A need to urinate more frequently (use the toilet more often), more urgently (use the toilet very quickly), or it burns when you urinate.    Nausea and vomiting.    Redness, swelling or pain around a vein in your leg.    Problems breastfeeding or a red or painful area on your breast.    Chest pain and cough or are gasping for air.    Problems with coping with sadness, anxiety or depression. If you have concerns about hurting yourself or the baby, call your provider immediately.      You have questions or concerns after you return home.                      Behavioral/Mental Health Resources      [place], [place], [facility], [place], [place], [place], [place], [place] and [facility]    **Patient should check with their health insurance to identify providers in network**    Crisis Lines:   Text 336240 from anywhere in the [place] to text with a trained Crisis Counselor   -[place]: [place] Crisis Connection: (464) 361-1103/1-843.887.5274   -[place]: [phone]    -[facility]/ [place]/ [place]/ [place]/ [place]: [phone]   -[facility]: [facility] Crisis: [phone]   -[place] and [facility]: [facility] Crisis Team ([phone] or  [phone]     Call the National Suicide Prevention Lifeline at 4-720-966-PGSI (4202) to be connected to a  counselor at a crisis center in your area if you, a family member, or friend are experiencing   thoughts of suicide. The call is FREE, confidential, and always available.       National [place] Mental Illness of [place] ([place]) provides support groups and  educational programs. Visit www.namihelps.org or call the [place] Helpline at 1-354.707.4098  Or [phone] for further information.       Crisis Mobile Serivces:   -[place]: [facility] [phone]  [phone]   -[facility]/ [place]/ [place]/ [place]/ [place]: [phone]   -[facility]: [facility] [phone]   -[place] and [facility]: [phone] or [phone]    Chemical Dependency Detox:  - [facility]:  [phone] - can ask for other recommendations  - [facility]/[place]([facility]):  [phone]  - [facility]: [phone] [facility]  - [facility] ([facility]):  [phone]  - [facility] : [phone] [facility]  -  [place] ([facility]):  [phone]  - [place] ([facility]):  [phone]    Chemical Dependency Assessment:   - [facility]: [phone]   - Behavioral Health Providers, can help find a provider near you:  [phone]  - No insurance- call county of residence and ask about applying for a Rule 25    Counseling/psychotherapy:  - Associated clinic of psychology - [place],/[place]/ [facility]/[place] /other locations: [phone]  - Behavioral Healthcare Providers- Can help find a provider near you:  [phone]  - [facility]-[place]:  [phone]  - [facility]- [place]:  [phone]  - [facility]- [place]:  [phone]  - [facility]: [phone]  - [facility]- [place]/[facility]/other locations:  [phone]  - [facility]- [facility]/[place]:  [phone]  - [facility] - [place]:  [phone]  - [facility] - [place]:  [phone]  [facility]- [place]-based in [place]:  [phone]  - [phone]  - [facility]- [place]: [phone]  - [facility]- [facility]:  [phone]  - [facility]- [place] [phone]  - [facility] counseling located in [place] (not affiliated with [place]):  [phone]  - [facility]- [place]:  [phone]/[place]: [phone] and other locations.  - [facility]- [place]: [phone]  - [facility]- [place]:  [phone]  - [facility]- [place]: [phone]  - Walk in counseling center (Free counseling services)- [facility]: [phone]     Psychiatry/ Mental Health Medication management:  - Associated clinic of psychology- [place],/[place]/[facility]/ [place]/ [facility] locations: [phone]  - Behavioral Healthcare Providers- Can help find a provider near you, if you have preferred one or [facility] they can identify who is in network and assist with scheduling an appointment:  [phone]  - [facility]: [place]/[facility] locations : [phone]  - [facility] - Dr Godwin Villa and other associates. Collaborative model  with PCP involvement:  [phone] (requires PCP referral specifically)  - [facility]- [place]:  [phone]  - [facility]: [place]:   [phone] (Requires individual to be engaged in counseling)  - [facility]- [place]: [phone] [facility]:  [phone]/[place]:  [phone]/ [place]:  [phone]  - [facility]- [place]:   [phone]  - [facility]- [place], [phone]  - [facility] Psychiatry - Medical students who rotate yearly:  [phone]    County Numbers  - [facility]: [phone]  - [facility]: [phone]  - [facility]: [phone]  - [facility] : [phone]  - [facility]: [phone]  - [facility]: [phone]  - [facility]: [phone]  - [facility]: [phone]  - [facility]: [phone]  - [facility]: [phone]    **Please note that this list does not include all agencies that provide services**    Care Transitions Team  at [facility] [phone]

## 2019-10-06 PROCEDURE — 25000132 ZZH RX MED GY IP 250 OP 250 PS 637: Performed by: OBSTETRICS & GYNECOLOGY

## 2019-10-06 PROCEDURE — 12000000 ZZH R&B MED SURG/OB

## 2019-10-06 RX ADMIN — OXYCODONE HYDROCHLORIDE 5 MG: 5 TABLET ORAL at 08:01

## 2019-10-06 RX ADMIN — ACETAMINOPHEN 975 MG: 325 TABLET, FILM COATED ORAL at 09:30

## 2019-10-06 RX ADMIN — VENLAFAXINE HYDROCHLORIDE 75 MG: 75 CAPSULE, EXTENDED RELEASE ORAL at 08:01

## 2019-10-06 RX ADMIN — OXYCODONE HYDROCHLORIDE 10 MG: 5 TABLET ORAL at 11:15

## 2019-10-06 RX ADMIN — SIMETHICONE CHEW TAB 80 MG 80 MG: 80 TABLET ORAL at 20:44

## 2019-10-06 RX ADMIN — OXYCODONE HYDROCHLORIDE 5 MG: 5 TABLET ORAL at 21:49

## 2019-10-06 RX ADMIN — SIMETHICONE CHEW TAB 80 MG 80 MG: 80 TABLET ORAL at 07:47

## 2019-10-06 RX ADMIN — SENNOSIDES AND DOCUSATE SODIUM 1 TABLET: 8.6; 5 TABLET ORAL at 07:47

## 2019-10-06 RX ADMIN — IBUPROFEN 800 MG: 800 TABLET ORAL at 14:21

## 2019-10-06 RX ADMIN — OXYCODONE HYDROCHLORIDE 10 MG: 5 TABLET ORAL at 17:28

## 2019-10-06 RX ADMIN — IBUPROFEN 800 MG: 800 TABLET ORAL at 01:57

## 2019-10-06 RX ADMIN — SIMETHICONE CHEW TAB 80 MG 80 MG: 80 TABLET ORAL at 14:27

## 2019-10-06 RX ADMIN — ACETAMINOPHEN 975 MG: 325 TABLET, FILM COATED ORAL at 01:57

## 2019-10-06 RX ADMIN — SENNOSIDES AND DOCUSATE SODIUM 1 TABLET: 8.6; 5 TABLET ORAL at 20:43

## 2019-10-06 RX ADMIN — OXYCODONE HYDROCHLORIDE 10 MG: 5 TABLET ORAL at 14:21

## 2019-10-06 RX ADMIN — ACETAMINOPHEN 975 MG: 325 TABLET, FILM COATED ORAL at 17:28

## 2019-10-06 RX ADMIN — IBUPROFEN 800 MG: 800 TABLET ORAL at 07:47

## 2019-10-06 RX ADMIN — IBUPROFEN 800 MG: 800 TABLET ORAL at 20:43

## 2019-10-06 NOTE — PROGRESS NOTES
PPD#2  Doing well. Feeling sore but pain well controlled on oral pain medication. Tolerating regular diet. Voiding well. Ambulating without difficulty. Lochia is scant. Breast feeding.     Vitals:    10/05/19 1642 10/05/19 2007 10/05/19 2351 10/06/19 0747   BP: 116/71 113/65 111/65 121/76   Pulse:  83 85 91   Resp: 14 12 16 18   Temp: 98.7  F (37.1  C) 97.3  F (36.3  C) 98.3  F (36.8  C) 98.4  F (36.9  C)   TempSrc: Oral Oral Oral Oral   SpO2:    98%     General Appearance: NAD  Abdomen: Soft, NT, ND. Fundus firm at U-2  Incision: Clean, dry and intact.   Extremities: NT, trace edema    Hemoglobin   Date Value Ref Range Status   10/05/2019 7.7 (L) 11.7 - 15.7 g/dL Final   10/04/2019 7.9 (L) 11.7 - 15.7 g/dL Final   ]    A/P: 39 year old  PPD#2 s/p scheduled RLTCS at 40w0d. Hemodynamically stable.   -- routine post-op cares  -- encouraged to ambulate  -- anticipate discharge home tomorrow when meeting discharge goals    Anita Tinoco MD  [facility]

## 2019-10-07 VITALS
OXYGEN SATURATION: 100 % | HEART RATE: 91 BPM | DIASTOLIC BLOOD PRESSURE: 78 MMHG | TEMPERATURE: 98.4 F | SYSTOLIC BLOOD PRESSURE: 135 MMHG | RESPIRATION RATE: 18 BRPM

## 2019-10-07 PROCEDURE — 25000132 ZZH RX MED GY IP 250 OP 250 PS 637: Performed by: OBSTETRICS & GYNECOLOGY

## 2019-10-07 RX ORDER — OXYCODONE HYDROCHLORIDE 5 MG/1
5-10 TABLET ORAL
Qty: 30 TABLET | Refills: 0 | Status: SHIPPED | OUTPATIENT
Start: 2019-10-07 | End: 2019-10-11

## 2019-10-07 RX ORDER — IBUPROFEN 600 MG/1
600 TABLET, FILM COATED ORAL EVERY 6 HOURS PRN
Qty: 30 TABLET | Refills: 1 | Status: SHIPPED | OUTPATIENT
Start: 2019-10-07 | End: 2022-07-29

## 2019-10-07 RX ORDER — ACETAMINOPHEN 325 MG/1
650 TABLET ORAL EVERY 4 HOURS PRN
COMMUNITY
Start: 2019-10-07 | End: 2022-07-29

## 2019-10-07 RX ORDER — VENLAFAXINE HYDROCHLORIDE 75 MG/1
75 CAPSULE, EXTENDED RELEASE ORAL DAILY
Qty: 90 CAPSULE | Refills: 0 | Status: SHIPPED | OUTPATIENT
Start: 2019-10-07 | End: 2022-07-29

## 2019-10-07 RX ADMIN — IBUPROFEN 800 MG: 800 TABLET ORAL at 02:11

## 2019-10-07 RX ADMIN — VENLAFAXINE HYDROCHLORIDE 75 MG: 75 CAPSULE, EXTENDED RELEASE ORAL at 09:37

## 2019-10-07 RX ADMIN — OXYCODONE HYDROCHLORIDE 5 MG: 5 TABLET ORAL at 06:30

## 2019-10-07 RX ADMIN — OXYCODONE HYDROCHLORIDE 10 MG: 5 TABLET ORAL at 09:37

## 2019-10-07 RX ADMIN — IBUPROFEN 800 MG: 800 TABLET ORAL at 09:37

## 2019-10-07 RX ADMIN — ACETAMINOPHEN 975 MG: 325 TABLET, FILM COATED ORAL at 01:54

## 2019-10-07 NOTE — PROGRESS NOTES
Pt was given a pack and play and the bag of diapers for 2nd stork due to financial hard times.  Pt and S.O. were very grateful.

## 2019-10-07 NOTE — DISCHARGE SUMMARY
[facility] Discharge Summary    Courtney Alaniz MRN# [id number]   Age: 39 year old YOB: 1980     Date of Admission:  10/4/2019  Date of Discharge::  10/7/2019  Admitting Physician:  Tanika Cifuentes MD  Discharge Physician:  Berenice Hebert MD     [facility] clinic: [facility]          Admission Diagnoses:   Intrauterine pregnancy at 40w0d weeks gestation  Repeat  section          Discharge Diagnosis:   Intrauterine pregnancy at 40w0d  weeks gestation  Repeat  section          Procedures:   Procedure(s): Repeat low transverse  section       No other procedures performed during this admission           Medications Prior to Admission:     Medications Prior to Admission   Medication Sig Dispense Refill Last Dose     [DISCONTINUED] venlafaxine (EFFEXOR-XR) 75 MG 24 hr capsule Take 1 capsule (75 mg) by mouth daily Needs to be seen before further refills (Patient not taking: Reported on 2019) 30 capsule 0 More than a month at Unknown time             Discharge Medications:     Current Discharge Medication List      START taking these medications    Details   acetaminophen (TYLENOL) 325 MG tablet Take 2 tablets (650 mg) by mouth every 4 hours as needed for other (multimodal surgical pain management along with NSAIDS and opioid medication as indicated based on pain control and physical function.)    Associated Diagnoses: Previous  delivery, antepartum condition or complication      ibuprofen (ADVIL/MOTRIN) 600 MG tablet Take 1 tablet (600 mg) by mouth every 6 hours as needed for other (cramping)  Qty: 30 tablet, Refills: 1    Associated Diagnoses: Previous  delivery, antepartum condition or complication      oxyCODONE (ROXICODONE) 5 MG tablet Take 1-2 tablets (5-10 mg) by mouth every 3 hours as needed for breakthrough pain  Qty: 30 tablet, Refills: 0    Associated Diagnoses: Previous  delivery, antepartum condition or  complication         CONTINUE these medications which have CHANGED    Details   venlafaxine (EFFEXOR-XR) 75 MG 24 hr capsule Take 1 capsule (75 mg) by mouth daily  Qty: 90 capsule, Refills: 0    Associated Diagnoses: Anxiety                   Consultations:   No consultations were requested during this admission          Brief History of Labor or Admission:   Courtney Alaniz is a 39 year old  @ 40w0d presented for repeat  section.  Prenatal course complicated by minimal prenatal care.             Hospital Course:   The patient's hospital course was unremarkable.  She recovered as anticipated and experienced no post-operative complications.  On discharge, her pain was well controlled. Vaginal bleeding is similar to peak menstrual flow.  Voiding without difficulty.  Ambulating well and tolerating a normal diet.  No fever or significant wound drainage.   Infant is stable.  She has a known history of depression and was restarted on her Effexor.  She was discharged on post-partum day #3.    PE:/77   Pulse 91   Temp 97.4  F (36.3  C) (Oral)   Resp 16   LMP 2018   SpO2 98%   Breastfeeding? Unknown    NAD  Abd: soft, nontender, fundus firm  Inc: clean and intact  Ext: calves nontender    Post-partum hemoglobin:   Hemoglobin   Date Value Ref Range Status   10/05/2019 7.7 (L) 11.7 - 15.7 g/dL Final             Discharge Instructions and Follow-Up:   Discharge diet: Regular   Discharge activity: No heavy lifting for 6 week(s)  No sex for 6 week(s)   Discharge follow-up: Follow up with OB clinic in 6 weeks   Wound care: Drink plenty of fluids  Ice to area for comfort  Keep wound clean and dry           Discharge Disposition:   Discharged to home      Attestation:  I have reviewed today's vital signs, notes, medications, labs and imaging.    Berenice Hebert MD

## 2019-10-07 NOTE — PROGRESS NOTES
Pt left via ambulatory with her S.O. and baby after discharge instructions were reviewed with her.  Pt stopped in the pharmacy and picked up her meds.

## 2019-10-07 NOTE — PLAN OF CARE
Problem: Adult Inpatient Plan of Care  Goal: Plan of Care Review  Outcome: Improving     Postpartum assessment WDL. VSS. Pain tolerable with scheduled Tylenol and Toradol. Incision c/d/i. Patient caring for self and baby independently with assistance from significant other. Will continue to monitor and assist with cares as needed.   
"Pt's [place]  depression score is 22.  Discussed result with the pt at length. Pt reports having a hx of a suicide attempt years ago, PTSD and no insurance or money for medications during pregnancy. Effexor was restarted yesterday. Pt has been working with a On license of [facility]  to get medical assistance which is still in process per pt.  Pt states that \" I've felt this way for along time\" and \"I don't have a plan or anything\". Staff educated pt on postpartum depression help and resources including hotline numbers. Verbal contact with pt to remain safe and seek emergency care if feeling suicidal. Dr. Tinoco called on the above concerns. Will ask for a repeat  visit tomorrow as pt is agreeable to this.  "
Data: Vital signs within normal limits. Postpartum checks within normal limits - see flow record. Patient eating and drinking normally. Patient able to empty bladder independently. . Patient ambulating independently..   No apparent signs of infection. Incision healing well. Patient is performing self cares and is able to care for infant. Positive attachment behaviors are observed with infant. Support persons are not  present.  Action:  Pain plan was discussed. Patient will request pain med when she is ready for it. Patient was medicated during the shift for pain. See MAR.Patient education done about hand hygiene,  cares, pain management/plan, rest, and discharge from hospital. See flow record.  Response:   Patient reassessed within 1 hour after each medication for pain. Patient stated that pain had improved. Patient stated that she was comfortable. .   Plan: Anticipate discharge on 10/719.   
Data: Vital signs within normal limits. Postpartum checks within normal limits - see flow record. Patient eating and drinking normally. Patient has not voided since delivery and will call for assistance. . Patient ambulating independently..   No apparent signs of infection. Incision healing well. Patient is performing self cares and is able to care for infant. Positive attachment behaviors are observed with infant. Support persons are present.  Action:  Pain plan was discussed. Patient will request pain med when she is ready for it.  Patient would like pain meds to be brought in when they are due. Patient was medicated during the shift for pain and cramping. See MAR.Patient education done about hand hygiene, formula feeding,  cares, and postpartum cares. See flow record.  Response:   Patient reassessed within 1 hour after each medication for pain. Patient stated that pain had improved. Patient stated that she was comfortable. .   Plan: Anticipate discharge on 10/7/19.   
FHT'S post spinal are in the 130's.  
Mother and baby transferred to postpartum room at 13:22 via bed and infant skin to skin with mom for post  section phase 1 recovery period. Patient oriented to room. Mother and baby bonding well and in stable condition upon transfer.    
Patient and significant other here for scheduled  section and . Patient reports 0 contractions Oriented to room. Verified patient's identification. EFM strip initiated.Fetus active per patient. IV started without difficulty and fluids infusing. History and plan of care reviewed.SCD's applied bilaterally.Noel cloth applied to abdomen . Patient has been NPO since 09:00 today (had a few ice chips).Dr ORALIA Cifuentes has been here to see patient. Nilson Cutler CRNA has been here to see patient.   
Pt assessment wnl, vss.  Pt resting comfortably in bed with multiple visitors present.  Pt is able to bend knees and ankles.  Scant flow.  Pt denies pain but is being medicated for pain per MAR.    Plan: Continue to monitor and assess    
Pt is ready for surgery. Ambulatory to the OR with staff.  
Pt is with VSS, assessments WNL, is tolerating a regular diet and her activities. Pt reports an increase in pain today and Oxycodone was added to the Ibuprofen and Tylenol routine with satisfactory results. Pt is with many visitors today. Rest and activity balance encouraged. Significant other is present, supportive and involved in baby cares.  
Pt met criteria for discharge form PACU to postpartum care at 15:15.   
S:Delivery  B:No Labor,40w0d .Repeat  section.  Lab Results   Component Value Date    GBS Negative 2019    with antibiotic treatment not indicated 4 hours prior to delivery.  A: Patient delivered Repeat C/S at 12:30 with Dr. CHRIS Cifuentes in attendance and baby placed on mother's abdomen for delayed cord clamping. Baby received from surgeon. Baby to warmer for assessment/resusitative efforts.. Placed skin to skin @ 13:15.. Apgars 7/8.  IV infusion of Oxytocin  infused. Placenta removal expressed. MD does want placenta sent to pathology.  See Flowsheet for VS and PP checks.  .  Labor care plan goals met, transition now to postpartum care.  R: Expect routine postpartum care. Anticipate first feeding within the hour or whenever infant displays feeding cues. Continue skin to skin. Prior discussion with mother indicates that feeding plan is Formula feeding. Educated mother on importance of small feeding at first in 10-15cc amounts. Expected feeding readiness cues and encouraged her to observe for these cues while rooming in.f    
Vitals stable.  Bleeding within appropriate amounts.  Incision without signs of infection Formula feeding; reviewed avoiding stimulating milk supply and milk reduction techniques if necessary.  Pain controlled with tylenol and motrin; encouraged patient to take oxycodone if she felt she needed it. Patient stated she would call if needed.  Significant other at bedside for support.  Plan of care reviewed with patient.    
Yes

## 2019-10-08 LAB
BLD PROD TYP BPU: NORMAL
BLD PROD TYP BPU: NORMAL
BLD UNIT ID BPU: 0
BLD UNIT ID BPU: 0
BLOOD PRODUCT CODE: NORMAL
BLOOD PRODUCT CODE: NORMAL
BPU ID: NORMAL
BPU ID: NORMAL
TRANSFUSION STATUS PATIENT QL: NORMAL

## 2019-10-09 ENCOUNTER — MYC REFILL (OUTPATIENT)
Dept: OBGYN | Facility: CLINIC | Age: 39
End: 2019-10-09

## 2019-10-09 DIAGNOSIS — O34.219 PREVIOUS CESAREAN DELIVERY, ANTEPARTUM CONDITION OR COMPLICATION: ICD-10-CM

## 2019-10-09 LAB — COPATH REPORT: NORMAL

## 2019-10-09 RX ORDER — OXYCODONE HYDROCHLORIDE 5 MG/1
5-10 TABLET ORAL
Qty: 30 TABLET | Refills: 0 | Status: CANCELLED | OUTPATIENT
Start: 2019-10-09

## 2019-10-10 ENCOUNTER — MYC MEDICAL ADVICE (OUTPATIENT)
Dept: OBGYN | Facility: CLINIC | Age: 39
End: 2019-10-10

## 2019-10-10 DIAGNOSIS — O34.219 PREVIOUS CESAREAN DELIVERY, ANTEPARTUM CONDITION OR COMPLICATION: ICD-10-CM

## 2019-10-10 NOTE — TELEPHONE ENCOUNTER
Message sent to discuss pain management: how often, how much, any alternatives tried?     Prior to sending to provider.     Jennifer LANZA RN   [facility]

## 2019-10-11 RX ORDER — OXYCODONE HYDROCHLORIDE 5 MG/1
5 TABLET ORAL EVERY 6 HOURS PRN
Qty: 15 TABLET | Refills: 0 | Status: SHIPPED | OUTPATIENT
Start: 2019-10-11 | End: 2022-07-29

## 2019-10-11 NOTE — TELEPHONE ENCOUNTER
Patient responded via [employer]. Encounters combined    Jennifer LANZA RN   [facility]

## 2019-10-11 NOTE — TELEPHONE ENCOUNTER
Okay to be walked to [facility] pharmacy if prescription refilled.    Jennifer LANZA RN   [facility]

## 2019-10-11 NOTE — TELEPHONE ENCOUNTER
Please see [employer] message regarding Oxycodone refill.     Delivery date: 10/4/19 via .   Jennifer LANZA RN   [facility]

## 2019-10-28 ENCOUNTER — HEALTH MAINTENANCE LETTER (OUTPATIENT)
Age: 39
End: 2019-10-28

## 2020-01-06 ENCOUNTER — MEDICAL CORRESPONDENCE (OUTPATIENT)
Dept: HEALTH INFORMATION MANAGEMENT | Facility: CLINIC | Age: 40
End: 2020-01-06

## 2020-02-05 ENCOUNTER — TELEPHONE (OUTPATIENT)
Dept: OBGYN | Facility: CLINIC | Age: 40
End: 2020-02-05

## 2020-02-05 NOTE — TELEPHONE ENCOUNTER
Panel Management Review      Health Maintenance List    Health Maintenance   Topic Date Due     PREVENTIVE CARE VISIT  1980     PAP  2014     PHQ-9  10/09/2014     INFLUENZA VACCINE (1) 2019     DTAP/TDAP/TD IMMUNIZATION (2 - Td) 2021     HIV SCREENING  Completed     DEPRESSION ACTION PLAN  Completed     IPV IMMUNIZATION  Aged Out     MENINGITIS IMMUNIZATION  Aged Out       Composite cancer screening  Chart review shows that this patient is due/due soon for the following Pap Smear  No results found for: PAP  Past Surgical History:   Procedure Laterality Date     BACK SURGERY      Age 15 for bone spur       SECTION      2018 for abruption       SECTION N/A 10/4/2019    Procedure: Repeat  section;  Surgeon: Tanika Cifuentes MD;  Location: [facility] OR       Is hysterectomy listed in surgical history? No   Is mastectomy listed in surgical history? No     Summary:    Patient is due/failing the following:   Pap Smear    Action needed: Patient needs office visit for Pap Smear & Post Partum F/U.    Type of outreach:  Sent [employer] message.      Staff Signature:  Diann Britton LPN

## 2020-02-05 NOTE — TELEPHONE ENCOUNTER
Please abstract the following data from this visit with this patient into the appropriate field in Epic:    Tests that can be patient reported without a hard copy:    Pap smear done on this date: 10/24/17 (approximately), by this group: 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

## 2021-01-15 ENCOUNTER — HEALTH MAINTENANCE LETTER (OUTPATIENT)
Age: 41
End: 2021-01-15

## 2021-05-25 ENCOUNTER — RECORDS - HEALTHEAST (OUTPATIENT)
Dept: ADMINISTRATIVE | Facility: CLINIC | Age: 41
End: 2021-05-25

## 2021-05-26 ENCOUNTER — RECORDS - HEALTHEAST (OUTPATIENT)
Dept: ADMINISTRATIVE | Facility: CLINIC | Age: 41
End: 2021-05-26

## 2021-05-31 VITALS — BODY MASS INDEX: 20.55 KG/M2 | WEIGHT: 116 LBS

## 2021-05-31 VITALS — BODY MASS INDEX: 19.84 KG/M2 | WEIGHT: 112 LBS | HEIGHT: 63 IN

## 2021-05-31 VITALS — WEIGHT: 106 LBS | BODY MASS INDEX: 18.19 KG/M2

## 2021-06-01 ENCOUNTER — RECORDS - HEALTHEAST (OUTPATIENT)
Dept: ADMINISTRATIVE | Facility: CLINIC | Age: 41
End: 2021-06-01

## 2021-06-01 VITALS — HEIGHT: 63 IN | WEIGHT: 122 LBS | BODY MASS INDEX: 21.62 KG/M2

## 2021-06-13 NOTE — PROGRESS NOTES
[facility]   Concerned bc they haven't been able to hear the heart beat in the last few days- have a doppler at home. And have hx of Miscarriages.   Is having some Nausea but no vomiting.   Appetite is okay.   No pelvic pain, bleeding   Has noticed some increased discharge. White. But not itchy or bad odor.      No HA or vision changes.      Labs/imaging reviewed: Hep B non -immune.   6.5cm ovarian cyst with thin septation. Patient thinks its the same one she had previously.   Discussed screening for aneuploidy and neural tube defects.  - patient requested progenity blood test.      Preformed physical exam : some fibrocystic changes noted on right breast medial lower quadrant, asymmetrical to other side. Would reexamine at next visit.   Cervix: was friable.   Reviewed ROSELINE, past medical history, past surgical history, family history, genetic history, and previous obstetrical history.    Encouraged good nutrition, well balanced diet, regular activity.  Wet mout  GC/CH  Pap smear obtained (due)    Melissa Farrell
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
## 2021-06-14 NOTE — PROGRESS NOTES
No ctx, lof, bleeding, or discharge.  No HA or vision changes.  Still having some morning sickness. But is getting better.  Was pretty rough for the last few weeks but she just started eating crackers and small meals and did okay.  Did have nausea and vomiting.  Does not want anything for now because feels like it is almost over.  Her and her  both finished the azithromycin for the chlamydia infection.  Abstained from sex for 1 week.    Good FM: Possibly.  Feeling some little movements.  Not sure if it is the fetus.    Labs/imaging reviewed: Due for follow-up pelvic ultrasound.  Also due for GC chlamydia test to cure.    Plan today:   20 week anatomy ultrasound scheduled  Follow-up pelvic ultrasound scheduled.  GC/chlamydia urine for test of cure  Follow-up in 4 weeks.    Melissa Farrell

## 2021-06-17 NOTE — ANESTHESIA POSTPROCEDURE EVALUATION
Patient: Courtney Alaniz   SECTION  Anesthesia type: general    Patient location: OB  Last vitals:   Vitals:    18 0610   BP: 107/65   Pulse: 90   Resp: 16   Temp: 36.7  C (98.1  F)   SpO2: 98%   Pt satisfied with SAB for C-S, has ambulated.  Post vital signs: stable  Level of consciousness: alert  Post-anesthesia pain: pain controlled  Post-anesthesia nausea and vomiting: no  Pulmonary: unassisted, return to baseline  Cardiovascular: stable and blood pressure at baseline  Hydration: adequate  Anesthetic events: no    QCDR Measures:  ASA# 11 - Janice-op Cardiac Arrest: ASA11B - Patient did NOT experience unanticipated cardiac arrest  ASA# 12 - Janice-op Mortality Rate: ASA12B - Patient did NOT die  ASA# 13 - PACU Re-Intubation Rate: ASA13B - Patient did NOT require a new airway mgmt  ASA# 10 - Composite Anes Safety: ASA10A - No serious adverse event    Additional Notes:

## 2021-06-17 NOTE — ANESTHESIA CARE TRANSFER NOTE
Last vitals:   Vitals:    04/17/18 1330   BP: 121/57   Pulse: 93   Resp: 20   Temp: 36.1  C (97  F)   SpO2: 100%     Patient's level of consciousness is drowsy  Spontaneous respirations: yes  Maintains airway independently: yes  Dentition unchanged: yes  Oropharynx: oropharynx clear of all foreign objects    QCDR Measures:  ASA# 20 - Surgical Safety Checklist: WHO surgical safety checklist completed prior to induction  PQRS# 430 - Adult PONV Prevention: 4558F - Pt received => 2 anti-emetic agents (different classes) preop & intraop  ASA# 8 - Peds PONV Prevention: NA - Not pediatric patient, not GA or 2 or more risk factors NOT present  PQRS# 424 - Janice-op Temp Management: 4559F - At least one body temp DOCUMENTED => 35.5C or 95.9F within required timeframe  PQRS# 426 - PACU Transfer Protocol: - Transfer of care checklist used  ASA# 14 - Acute Post-op Pain: ASA14A - Patient experienced pain >= 7 out of 10     Treating pain.
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
## 2021-06-17 NOTE — ANESTHESIA PROCEDURE NOTES
Peripheral Block    Patient location during procedure: post-op  Start time: 4/17/2018 2:20 PM  End time: 4/17/2018 2:26 PM  post-op analgesia per surgeon order as noted in medical record  Staffing:  Performing  Anesthesiologist: PAULINA QUINN  Preanesthetic Checklist  Completed: patient identified, site marked, risks, benefits, and alternatives discussed, timeout performed, consent obtained, at patient's request, airway assessed, oxygen available, suction available, emergency drugs available and hand hygiene performed  Peripheral Block  Block type: other, TAP  Prep: ChloraPrep  Patient position: supine  Patient monitoring: cardiac monitor, continuous pulse oximetry, blood pressure and heart rate  Laterality: bilateral, same technique used bilaterally  Injection technique: ultrasound guided    Ultrasound used to visualize needle placement in proximity to nerve being blocked: yes   Permanent ultrasound image captured for medical record  Sterile gel and probe cover used for ultrasound.    Needle  Needle type: echogenic   Needle gauge: 20G  Needle length: 6 in  no peripheral nerve catheter placed  Assessment  Injection assessment: no difficulty with injection, negative aspiration for heme and no paresthesia on injection

## 2021-10-24 ENCOUNTER — HEALTH MAINTENANCE LETTER (OUTPATIENT)
Age: 41
End: 2021-10-24

## 2022-02-13 ENCOUNTER — HEALTH MAINTENANCE LETTER (OUTPATIENT)
Age: 42
End: 2022-02-13

## 2022-02-24 ENCOUNTER — E-VISIT (OUTPATIENT)
Dept: OBGYN | Facility: CLINIC | Age: 42
End: 2022-02-24
Payer: COMMERCIAL

## 2022-02-24 DIAGNOSIS — R11.2 NAUSEA AND VOMITING, INTRACTABILITY OF VOMITING NOT SPECIFIED, UNSPECIFIED VOMITING TYPE: Primary | ICD-10-CM

## 2022-02-24 PROCEDURE — 99207 PR NON-BILLABLE SERV PER CHARTING: CPT | Performed by: OBSTETRICS & GYNECOLOGY

## 2022-02-25 ENCOUNTER — E-VISIT (OUTPATIENT)
Dept: OBGYN | Facility: CLINIC | Age: 42
End: 2022-02-25

## 2022-02-25 ENCOUNTER — MYC MEDICAL ADVICE (OUTPATIENT)
Dept: OBGYN | Facility: CLINIC | Age: 42
End: 2022-02-25
Payer: COMMERCIAL

## 2022-02-25 ENCOUNTER — E-CONSULT (OUTPATIENT)
Dept: BEHAVIORAL HEALTH | Facility: CLINIC | Age: 42
End: 2022-02-25

## 2022-02-25 DIAGNOSIS — F33.9 MAJOR DEPRESSIVE DISORDER, RECURRENT EPISODE (H): Primary | ICD-10-CM

## 2022-02-25 PROCEDURE — 99207 E-CONSULT TO BEHAVIORAL HEALTH (ADULT OUTPT PROVIDER TO SPECIALIST WRITTEN QUESTION & RESPONSE): CPT | Performed by: OBSTETRICS & GYNECOLOGY

## 2022-02-25 ASSESSMENT — ANXIETY QUESTIONNAIRES
1. FEELING NERVOUS, ANXIOUS, OR ON EDGE: NEARLY EVERY DAY
GAD7 TOTAL SCORE: 20
3. WORRYING TOO MUCH ABOUT DIFFERENT THINGS: NEARLY EVERY DAY
GAD7 TOTAL SCORE: 20
GAD7 TOTAL SCORE: 20
2. NOT BEING ABLE TO STOP OR CONTROL WORRYING: NEARLY EVERY DAY
8. IF YOU CHECKED OFF ANY PROBLEMS, HOW DIFFICULT HAVE THESE MADE IT FOR YOU TO DO YOUR WORK, TAKE CARE OF THINGS AT HOME, OR GET ALONG WITH OTHER PEOPLE?: EXTREMELY DIFFICULT
5. BEING SO RESTLESS THAT IT IS HARD TO SIT STILL: NEARLY EVERY DAY
7. FEELING AFRAID AS IF SOMETHING AWFUL MIGHT HAPPEN: NEARLY EVERY DAY
6. BECOMING EASILY ANNOYED OR IRRITABLE: MORE THAN HALF THE DAYS
7. FEELING AFRAID AS IF SOMETHING AWFUL MIGHT HAPPEN: NEARLY EVERY DAY
4. TROUBLE RELAXING: NEARLY EVERY DAY

## 2022-02-25 ASSESSMENT — PATIENT HEALTH QUESTIONNAIRE - PHQ9
10. IF YOU CHECKED OFF ANY PROBLEMS, HOW DIFFICULT HAVE THESE PROBLEMS MADE IT FOR YOU TO DO YOUR WORK, TAKE CARE OF THINGS AT HOME, OR GET ALONG WITH OTHER PEOPLE: VERY DIFFICULT
SUM OF ALL RESPONSES TO PHQ QUESTIONS 1-9: 24
SUM OF ALL RESPONSES TO PHQ QUESTIONS 1-9: 24

## 2022-02-25 NOTE — PROGRESS NOTES
E-Consult has been denied due to: Doesn't meet criteria for E-Consult.    Interprofessional consultation requested by:  Tanika Cifuentes MD      Clinical Question/Purpose: MY CLINICAL QUESTION IS: treatment and evaluation needed, outside referring MD's scope of practice    Recommendations:     An E-Consult is a health care service used for non-urgent clinical questions that involves a provider sending a written request to a specialty physician to provide a written assessment and recommendations WITHOUT seeing the patient. The referring provider may then choose to engage the recommendations at their own clinical discretion.   An E-Consult is NOT a referral to request an appointment.     My total time spent reviewing clinical information and formulating assessment was 5 minutes.    Sudarshan Krishnan MD

## 2022-02-25 NOTE — TELEPHONE ENCOUNTER
Notes per Dr. Cifuentes as below.    Hai Valdez,  I would recommend you are seen in urgent care or emergency department today. I think you should have swabs taken for influenza, COVID and strep throat. Also, if you are getting dehydrated you should be given some IV fluids. Sorry you are so sick!  Dr. Cifuentes     Called patient to follow up on PHQ-9 scores and Evisit that patient submitted.  Unable to reach patient. Identifiable voice mail available so message left with recommendation to proceed to the ER. Asked patient to return call to clinic to confirm she received message from Dr. Cifuentes. Will also watch [employer] as message was sent.    Tsering Day   [facility]  RN

## 2022-02-25 NOTE — TELEPHONE ENCOUNTER
Unable to reach patient.  Do not see patient has checked in to [facility] ER.  Patient may have gone elsewhere.  Inflection message sent with Crisis information on how to get help.    Tsering Day   [facility]  RN

## 2022-02-26 ASSESSMENT — ANXIETY QUESTIONNAIRES: GAD7 TOTAL SCORE: 20

## 2022-02-26 ASSESSMENT — PATIENT HEALTH QUESTIONNAIRE - PHQ9: SUM OF ALL RESPONSES TO PHQ QUESTIONS 1-9: 24

## 2022-03-01 ENCOUNTER — E-VISIT (OUTPATIENT)
Dept: URGENT CARE | Facility: CLINIC | Age: 42
End: 2022-03-01
Payer: COMMERCIAL

## 2022-03-01 DIAGNOSIS — N39.0 ACUTE UTI (URINARY TRACT INFECTION): Primary | ICD-10-CM

## 2022-03-01 PROCEDURE — 99207 PR NO CHARGE LOS: CPT | Performed by: NURSE PRACTITIONER

## 2022-03-02 RX ORDER — NITROFURANTOIN 25; 75 MG/1; MG/1
100 CAPSULE ORAL 2 TIMES DAILY
Qty: 10 CAPSULE | Refills: 0 | Status: SHIPPED | OUTPATIENT
Start: 2022-03-02 | End: 2022-03-07

## 2022-03-02 RX ORDER — FLUCONAZOLE 150 MG/1
150 TABLET ORAL ONCE
Qty: 1 TABLET | Refills: 0 | Status: SHIPPED | OUTPATIENT
Start: 2022-03-02 | End: 2022-03-02

## 2022-03-02 NOTE — PATIENT INSTRUCTIONS
Dear Courtney Alaniz    After reviewing your responses, I've been able to diagnose you with a urinary tract infection, which is a common infection of the bladder with bacteria.  This is not a sexually transmitted infection, though urinating immediately after intercourse can help prevent infections.  Drinking lots of fluids is also helpful to clear your current infection and prevent the next one.      I have sent a prescription for antibiotics to your pharmacy to treat this infection.    It is important that you take all of your prescribed medication even if your symptoms are improving after a few doses.  Taking all of your medicine helps prevent the symptoms from returning.     If your symptoms worsen, you develop pain in your back or stomach, develop fevers, or are not improving in 5 days, please contact your primary care provider for an appointment or visit any of our convenient Walk-in or Urgent Care Centers to be seen, which can be found on our website here.    Thanks again for choosing us as your health care partner,    AMY Haney CNP    Urinary Tract Infections in Women  Urinary tract infections (UTIs) are most often caused by bacteria. These bacteria enter the urinary tract. The bacteria may come from inside the body. Or they may travel from the skin outside the rectum or vagina into the urethra. Female anatomy makes it easy for bacteria from the bowel to enter a woman s urinary tract, which is the most common source of UTI. This means women develop UTIs more often than men. Pain in or around the urinary tract is a common UTI symptom. But the only way to know for sure if you have a UTI for the healthcare provider to test your urine. The two tests that may be done are the urinalysis and urine culture.     Types of UTIs    Cystitis. A bladder infection (cystitis) is the most common UTI in women. You may have urgent or frequent need to pee. You may also have pain, burning when you pee, and  bloody urine.    Urethritis. This is an inflamed urethra, which is the tube that carries urine from the bladder to outside the body. You may have lower stomach or back pain. You may also have urgent or frequent need to pee.    Pyelonephritis. This is a kidney infection. If not treated, it can be serious and damage your kidneys. In severe cases, you may need to stay in the hospital. You may have a fever and lower back pain.    Medicines to treat a UTI  Most UTIs are treated with antibiotics. These kill the bacteria. The length of time you need to take them depends on the type of infection. It may be as short as 3 days. If you have repeated UTIs, you may need a low-dose antibiotic for several months. Take antibiotics exactly as directed. Don t stop taking them until all of the medicine is gone. If you stop taking the antibiotic too soon, the infection may not go away. You may also develop a resistance to the antibiotic. This can make it much harder to treat.   Lifestyle changes to treat and prevent UTIs   The lifestyle changes below will help get rid of your UTI. They may also help prevent future UTIs.     Drink plenty of fluids. This includes water, juice, or other caffeine-free drinks. Fluids help flush bacteria out of your body.    Empty your bladder. Always empty your bladder when you feel the urge to pee. And always pee before going to sleep. Urine that stays in your bladder can lead to infection. Try to pee before and after sex as well.    Practice good personal hygiene. Wipe yourself from front to back after using the toilet. This helps keep bacteria from getting into the urethra.    Use condoms during sex. These help prevent UTIs caused by sexually transmitted bacteria. Also don't use spermicides during sex. These can increase the risk for UTIs. Choose other forms of birth control instead. For women who tend to get UTIs after sex, a low-dose of a preventive antibiotic may be used. Be sure to discuss this  option with your healthcare provider.    Follow up with your healthcare provider as directed. He or she may test to make sure the infection has cleared. If needed, more treatment may be started.  Disha last reviewed this educational content on 7/1/2019 2000-2021 [employer]. All rights reserved. This information is not intended as a substitute for professional medical care. Always follow your healthcare professional's instructions.

## 2022-03-03 ENCOUNTER — APPOINTMENT (OUTPATIENT)
Dept: URGENT CARE | Facility: CLINIC | Age: 42
End: 2022-03-03
Payer: COMMERCIAL

## 2022-04-06 ENCOUNTER — HOSPITAL ENCOUNTER (OUTPATIENT)
Dept: BEHAVIORAL HEALTH | Facility: CLINIC | Age: 42
Discharge: HOME | End: 2022-04-06
Attending: FAMILY MEDICINE | Admitting: FAMILY MEDICINE
Payer: COMMERCIAL

## 2022-04-06 VITALS — HEIGHT: 63 IN | BODY MASS INDEX: 17.19 KG/M2 | WEIGHT: 97 LBS

## 2022-04-06 DIAGNOSIS — F10.20 ALCOHOL USE DISORDER, SEVERE, DEPENDENCE (H): ICD-10-CM

## 2022-04-06 PROCEDURE — H0001 ALCOHOL AND/OR DRUG ASSESS: HCPCS | Mod: GT,95

## 2022-04-06 ASSESSMENT — COLUMBIA-SUICIDE SEVERITY RATING SCALE - C-SSRS
3. HAVE YOU BEEN THINKING ABOUT HOW YOU MIGHT KILL YOURSELF?: YES
REASONS FOR IDEATION PAST MONTH: MOSTLY TO END OR STOP THE PAIN (YOU COULDN'T GO ON LIVING WITH THE PAIN OR HOW YOU WERE FEELING)
4. HAVE YOU HAD THESE THOUGHTS AND HAD SOME INTENTION OF ACTING ON THEM?: YES
TOTAL  NUMBER OF ABORTED OR SELF INTERRUPTED ATTEMPTS PAST 3 MONTHS: YES
2. HAVE YOU ACTUALLY HAD ANY THOUGHTS OF KILLING YOURSELF?: YES
5. HAVE YOU STARTED TO WORK OUT OR WORKED OUT THE DETAILS OF HOW TO KILL YOURSELF? DO YOU INTEND TO CARRY OUT THIS PLAN?: YES
LETHALITY/MEDICAL DAMAGE CODE MOST RECENT ACTUAL ATTEMPT: MINOR PHYSICAL DAMAGE
TOTAL  NUMBER OF INTERRUPTED ATTEMPTS LIFETIME: YES
1. IN THE PAST MONTH, HAVE YOU WISHED YOU WERE DEAD OR WISHED YOU COULD GO TO SLEEP AND NOT WAKE UP?: YES
REASONS FOR IDEATION LIFETIME: MOSTLY TO END OR STOP THE PAIN (YOU COULDN'T GO ON LIVING WITH THE PAIN OR HOW YOU WERE FEELING)
LETHALITY/MEDICAL DAMAGE CODE MOST RECENT POTENTIAL ATTEMPT: BEHAVIOR LIKELY TO RESULT IN INJURY BUT NOT LIKELY TO CAUSE DEATH
ATTEMPT PAST THREE MONTHS: NO
2. HAVE YOU ACTUALLY HAD ANY THOUGHTS OF KILLING YOURSELF?: YES
6. HAVE YOU EVER DONE ANYTHING, STARTED TO DO ANYTHING, OR PREPARED TO DO ANYTHING TO END YOUR LIFE?: NO
1. HAVE YOU WISHED YOU WERE DEAD OR WISHED YOU COULD GO TO SLEEP AND NOT WAKE UP?: YES
ATTEMPT LIFETIME: YES
4. HAVE YOU HAD THESE THOUGHTS AND HAD SOME INTENTION OF ACTING ON THEM?: YES
TOTAL  NUMBER OF INTERRUPTED ATTEMPTS PAST 3 MONTHS: YES
TOTAL  NUMBER OF ABORTED OR SELF INTERRUPTED ATTEMPTS LIFETIME: YES
LETHALITY/MEDICAL DAMAGE CODE MOST LETHAL ACTUAL ATTEMPT: MINOR PHYSICAL DAMAGE

## 2022-04-06 ASSESSMENT — ANXIETY QUESTIONNAIRES
6. BECOMING EASILY ANNOYED OR IRRITABLE: NOT AT ALL
IF YOU CHECKED OFF ANY PROBLEMS ON THIS QUESTIONNAIRE, HOW DIFFICULT HAVE THESE PROBLEMS MADE IT FOR YOU TO DO YOUR WORK, TAKE CARE OF THINGS AT HOME, OR GET ALONG WITH OTHER PEOPLE: SOMEWHAT DIFFICULT
GAD7 TOTAL SCORE: 16
5. BEING SO RESTLESS THAT IT IS HARD TO SIT STILL: MORE THAN HALF THE DAYS
3. WORRYING TOO MUCH ABOUT DIFFERENT THINGS: NEARLY EVERY DAY
2. NOT BEING ABLE TO STOP OR CONTROL WORRYING: NEARLY EVERY DAY
4. TROUBLE RELAXING: NEARLY EVERY DAY
1. FEELING NERVOUS, ANXIOUS, OR ON EDGE: NEARLY EVERY DAY
7. FEELING AFRAID AS IF SOMETHING AWFUL MIGHT HAPPEN: MORE THAN HALF THE DAYS

## 2022-04-06 ASSESSMENT — PAIN SCALES - GENERAL: PAINLEVEL: SEVERE PAIN (6)

## 2022-04-06 ASSESSMENT — PATIENT HEALTH QUESTIONNAIRE - PHQ9: SUM OF ALL RESPONSES TO PHQ QUESTIONS 1-9: 19
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
## 2022-04-07 ASSESSMENT — ANXIETY QUESTIONNAIRES: GAD7 TOTAL SCORE: 16

## 2022-04-07 NOTE — ADDENDUM NOTE
Encounter addended by: Sofi Cagle [facility] on: 4/7/2022 12:48 PM   Actions taken: Flowsheet accepted, Clinical Note Signed

## 2022-04-08 NOTE — ADDENDUM NOTE
Encounter addended by: Sofi Cagle [facility] on: 4/8/2022 3:23 PM   Actions taken: Clinical Note Signed

## 2022-04-16 ENCOUNTER — E-VISIT (OUTPATIENT)
Dept: URGENT CARE | Facility: URGENT CARE | Age: 42
End: 2022-04-16
Payer: COMMERCIAL

## 2022-04-16 DIAGNOSIS — N39.0 ACUTE UTI (URINARY TRACT INFECTION): Primary | ICD-10-CM

## 2022-04-16 PROCEDURE — 99207 PR NO CHARGE LOS: CPT | Performed by: INTERNAL MEDICINE

## 2022-04-16 RX ORDER — NITROFURANTOIN 25; 75 MG/1; MG/1
100 CAPSULE ORAL 2 TIMES DAILY
Qty: 10 CAPSULE | Refills: 0 | Status: SHIPPED | OUTPATIENT
Start: 2022-04-16 | End: 2022-04-21

## 2022-04-16 NOTE — PATIENT INSTRUCTIONS
Dear Courtney Alaniz    After reviewing your responses, I've been able to diagnose you with a urinary tract infection, which is a common infection of the bladder with bacteria.  This is not a sexually transmitted infection, though urinating immediately after intercourse can help prevent infections.  Drinking lots of fluids is also helpful to clear your current infection and prevent the next one.      I have sent a prescription for antibiotics to your pharmacy to treat this infection.    It is important that you take all of your prescribed medication even if your symptoms are improving after a few doses.  Taking all of your medicine helps prevent the symptoms from returning.     If your symptoms worsen, you develop pain in your back or stomach, develop fevers, or are not improving in 5 days, please contact your primary care provider for an appointment or visit any of our convenient Walk-in or Urgent Care Centers to be seen, which can be found on our website here.    Thanks again for choosing us as your health care partner,    Heavenly Fischer MD    Urinary Tract Infections in Women  Urinary tract infections (UTIs) are most often caused by bacteria. These bacteria enter the urinary tract. The bacteria may come from inside the body. Or they may travel from the skin outside the rectum or vagina into the urethra. Female anatomy makes it easy for bacteria from the bowel to enter a woman s urinary tract, which is the most common source of UTI. This means women develop UTIs more often than men. Pain in or around the urinary tract is a common UTI symptom. But the only way to know for sure if you have a UTI for the healthcare provider to test your urine. The two tests that may be done are the urinalysis and urine culture.     Types of UTIs    Cystitis. A bladder infection (cystitis) is the most common UTI in women. You may have urgent or frequent need to pee. You may also have pain, burning when you pee, and  bloody urine.    Urethritis. This is an inflamed urethra, which is the tube that carries urine from the bladder to outside the body. You may have lower stomach or back pain. You may also have urgent or frequent need to pee.    Pyelonephritis. This is a kidney infection. If not treated, it can be serious and damage your kidneys. In severe cases, you may need to stay in the hospital. You may have a fever and lower back pain.    Medicines to treat a UTI  Most UTIs are treated with antibiotics. These kill the bacteria. The length of time you need to take them depends on the type of infection. It may be as short as 3 days. If you have repeated UTIs, you may need a low-dose antibiotic for several months. Take antibiotics exactly as directed. Don t stop taking them until all of the medicine is gone. If you stop taking the antibiotic too soon, the infection may not go away. You may also develop a resistance to the antibiotic. This can make it much harder to treat.   Lifestyle changes to treat and prevent UTIs   The lifestyle changes below will help get rid of your UTI. They may also help prevent future UTIs.     Drink plenty of fluids. This includes water, juice, or other caffeine-free drinks. Fluids help flush bacteria out of your body.    Empty your bladder. Always empty your bladder when you feel the urge to pee. And always pee before going to sleep. Urine that stays in your bladder can lead to infection. Try to pee before and after sex as well.    Practice good personal hygiene. Wipe yourself from front to back after using the toilet. This helps keep bacteria from getting into the urethra.    Use condoms during sex. These help prevent UTIs caused by sexually transmitted bacteria. Also don't use spermicides during sex. These can increase the risk for UTIs. Choose other forms of birth control instead. For women who tend to get UTIs after sex, a low-dose of a preventive antibiotic may be used. Be sure to discuss this  option with your healthcare provider.    Follow up with your healthcare provider as directed. He or she may test to make sure the infection has cleared. If needed, more treatment may be started.  [employer] last reviewed this educational content on 7/1/2019 2000-2021 [employer]. All rights reserved. This information is not intended as a substitute for professional medical care. Always follow your healthcare professional's instructions.          Understanding Urinary Tract Infections (UTIs)   Most UTIs are caused by bacteria, but they may also be caused by viruses or fungi. Bacteria from the bowel are the most common source of infection. The infection may start because of any of the following:     Sexual activity.  During sex, bacteria can travel from the penis, vagina, or rectum into the urethra.     Bacteria outside the rectum getting into the urethra.  Bacteria on the skin outside the rectum may travel into the urethra. This is more common in women since the rectum and urethra are closer to each other than in men. Wiping from front to back after using the toilet and keeping the area clean can help prevent germs from getting to the urethra.    Blocked urine flow through the urinary tract. If urine sits too long, germs may start to grow out of control.  Parts of the urinary tract  The infection can occur in any part of the urinary tract.       The kidneys. These organs collect and store urine.    The ureters. These tubes carry urine from the kidneys to the bladder.    The bladder. This holds urine until you are ready to let it out.    The urethra. This tube carries urine from the bladder out of the body. It is shorter in women, so bacteria can move through it more easily. The urethra is longer in men, so a UTI is less likely to reach the bladder or kidneys in men.  [employer] last reviewed this educational content on 1/1/2020 2000-2021 [employer]. All rights reserved. This information  is not intended as a substitute for professional medical care. Always follow your healthcare professional's instructions.

## 2022-04-18 ENCOUNTER — E-VISIT (OUTPATIENT)
Dept: URGENT CARE | Facility: CLINIC | Age: 42
End: 2022-04-18
Payer: COMMERCIAL

## 2022-04-18 DIAGNOSIS — N89.8 VAGINAL DISCHARGE: ICD-10-CM

## 2022-04-18 DIAGNOSIS — N94.9 VAGINAL DISCOMFORT: ICD-10-CM

## 2022-04-18 PROCEDURE — 99207 PR NON-BILLABLE SERV PER CHARTING: CPT | Performed by: FAMILY MEDICINE

## 2022-07-14 ENCOUNTER — E-VISIT (OUTPATIENT)
Dept: URGENT CARE | Facility: CLINIC | Age: 42
End: 2022-07-14
Payer: COMMERCIAL

## 2022-07-14 DIAGNOSIS — K62.9 ANUS PROBLEMS: Primary | ICD-10-CM

## 2022-07-14 PROCEDURE — 99207 PR NON-BILLABLE SERV PER CHARTING: CPT | Performed by: PHYSICIAN ASSISTANT

## 2022-07-14 NOTE — PATIENT INSTRUCTIONS
Thank you for choosing us for your care. I think an in-clinic visit would be best next steps based on your symptoms. Please schedule a clinic appointment; you won t be charged for this eVisit. I do apologize about your fear of an exam and you may request a female provider for sure. Unfortunately, this just isn't something that can be properly diagnosed via a virtual visit. You can schedule with a primary care provider or go to urgent care (either way you can request a female).      You can schedule an appointment right here in [place], or call [phone]

## 2022-07-29 ENCOUNTER — E-VISIT (OUTPATIENT)
Dept: URGENT CARE | Facility: CLINIC | Age: 42
End: 2022-07-29
Payer: COMMERCIAL

## 2022-07-29 DIAGNOSIS — N94.9 VAGINAL DISCOMFORT: Primary | ICD-10-CM

## 2022-07-29 PROCEDURE — 99207 PR NON-BILLABLE SERV PER CHARTING: CPT | Performed by: NURSE PRACTITIONER

## 2022-07-30 NOTE — PATIENT INSTRUCTIONS
Dear Courtney Alaniz,    We are sorry you are not feeling well. Based on the responses you provided, it is recommended that you be seen in-person in urgent care so we can better evaluate your symptoms. Please click here to find the nearest urgent care location to you.   You will not be charged for this Visit. Thank you for trusting us with your care.    Hyacinth Phelan, CNP

## 2022-09-15 ENCOUNTER — TELEPHONE (OUTPATIENT)
Dept: BEHAVIORAL HEALTH | Facility: CLINIC | Age: 42
End: 2022-09-15

## 2022-09-15 ASSESSMENT — ANXIETY QUESTIONNAIRES
5. BEING SO RESTLESS THAT IT IS HARD TO SIT STILL: NEARLY EVERY DAY
GAD7 TOTAL SCORE: 17
2. NOT BEING ABLE TO STOP OR CONTROL WORRYING: NEARLY EVERY DAY
4. TROUBLE RELAXING: NEARLY EVERY DAY
1. FEELING NERVOUS, ANXIOUS, OR ON EDGE: MORE THAN HALF THE DAYS
7. FEELING AFRAID AS IF SOMETHING AWFUL MIGHT HAPPEN: MORE THAN HALF THE DAYS
8. IF YOU CHECKED OFF ANY PROBLEMS, HOW DIFFICULT HAVE THESE MADE IT FOR YOU TO DO YOUR WORK, TAKE CARE OF THINGS AT HOME, OR GET ALONG WITH OTHER PEOPLE?: VERY DIFFICULT
7. FEELING AFRAID AS IF SOMETHING AWFUL MIGHT HAPPEN: MORE THAN HALF THE DAYS
3. WORRYING TOO MUCH ABOUT DIFFERENT THINGS: NEARLY EVERY DAY
IF YOU CHECKED OFF ANY PROBLEMS ON THIS QUESTIONNAIRE, HOW DIFFICULT HAVE THESE PROBLEMS MADE IT FOR YOU TO DO YOUR WORK, TAKE CARE OF THINGS AT HOME, OR GET ALONG WITH OTHER PEOPLE: VERY DIFFICULT
6. BECOMING EASILY ANNOYED OR IRRITABLE: SEVERAL DAYS

## 2022-09-21 ENCOUNTER — HOSPITAL ENCOUNTER (OUTPATIENT)
Dept: BEHAVIORAL HEALTH | Facility: CLINIC | Age: 42
Discharge: HOME | End: 2022-09-21
Attending: FAMILY MEDICINE | Admitting: FAMILY MEDICINE
Payer: COMMERCIAL

## 2022-09-21 ENCOUNTER — TELEPHONE (OUTPATIENT)
Dept: BEHAVIORAL HEALTH | Facility: CLINIC | Age: 42
End: 2022-09-21

## 2022-09-21 PROCEDURE — H0001 ALCOHOL AND/OR DRUG ASSESS: HCPCS | Mod: GT,95 | Performed by: COUNSELOR

## 2022-09-21 ASSESSMENT — COLUMBIA-SUICIDE SEVERITY RATING SCALE - C-SSRS
ATTEMPT SINCE LAST CONTACT: NO
6. HAVE YOU EVER DONE ANYTHING, STARTED TO DO ANYTHING, OR PREPARED TO DO ANYTHING TO END YOUR LIFE?: NO
REASONS FOR IDEATION SINCE LAST CONTACT: MOSTLY TO END OR STOP THE PAIN (YOU COULDN'T GO ON LIVING WITH THE PAIN OR HOW YOU WERE FEELING)
5. HAVE YOU STARTED TO WORK OUT OR WORKED OUT THE DETAILS OF HOW TO KILL YOURSELF? DO YOU INTEND TO CARRY OUT THIS PLAN?: NO
TOTAL  NUMBER OF INTERRUPTED ATTEMPTS SINCE LAST CONTACT: NO
SUICIDE, SINCE LAST CONTACT: NO
2. HAVE YOU ACTUALLY HAD ANY THOUGHTS OF KILLING YOURSELF?: SEE ABOVE
1. SINCE LAST CONTACT, HAVE YOU WISHED YOU WERE DEAD OR WISHED YOU COULD GO TO SLEEP AND NOT WAKE UP?: YES
2. HAVE YOU ACTUALLY HAD ANY THOUGHTS OF KILLING YOURSELF?: YES
TOTAL  NUMBER OF ABORTED OR SELF INTERRUPTED ATTEMPTS SINCE LAST CONTACT: NO

## 2022-09-21 ASSESSMENT — PATIENT HEALTH QUESTIONNAIRE - PHQ9
SUM OF ALL RESPONSES TO PHQ QUESTIONS 1-9: 22
SUM OF ALL RESPONSES TO PHQ QUESTIONS 1-9: 22

## 2022-09-21 NOTE — PROGRESS NOTES
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 worthlessness or inappropriate and excessive guilt.    - Diminished ability to think or concentrate, or indecisiveness.    - Recurrent thoughts of death (not just fear of dying), recurrent suicidal ideation without a specific plan, or a suicide attempt or a specific plan for committing suicide.   B) The symptoms cause clinically significant distress or impairment in social, occupational, or other important areas of functioning  C) The episode is not attributable to the physiological effects of a substance or to another medical condition  D) The occurence of major depressive episode is not better explained by other thought / psychotic disorders  E) There has never been a manic episode or hypomanic episode  Substance Use Disorder   Substance is often taken in larger amounts or over a longer period than was intended.  Met for:  Alcohol There is persistent desire or unsuccessful efforts to cut down or control use of the substance.  Met for:  Alcohol, amphetamines, A great deal of time is spent in activities necessary to obtain the substance, use the substance, or recover from its effects.  Met for:  Alcohol Craving, or a strong desire or urge to use the substance.  Met for:  Alcohol Continued use of the  substance despite having persistent or recurrent social or interpersonal problems caused or exacerbated by the effects of its use.  Met for:  Alcohol  and amphetamines Use of the substance is continued despite knowledge of having a persistent or recurrent physical or psychological problem that is likely to have been cause or exacerbated by the substance.  Met for:  Alcohol and amphetamines Tolerance:  either a need for markedly increased amounts of the substance to achieve the desired effect or a markedly diminished effect with continued use of the dame amount of the substance.  Met for:  Alcohol Withdrawal:  either patient endorses characteristic withdrawal syndrome for the substance or the substance (or closely related substance) is taken to relieve or avoid withdrawal symptoms.  Met for:  Alcohol      Functional Status:  Patient reports the following functional impairments: childcare / parenting, health maintenance, management of the household and or completion of tasks, money management, relationship(s), self-care, social interactions and work / vocational responsibilities.     WHODAS:   WHODAS 2.0 Total Score 9/15/2022   Total Score 35   Total Score MyChart 35       Clinical Summary:  1. Reason for assessment: seeking medications for depression and anxiety and CD eval.  2. Psychosocial, Cultural and Contextual Factors: homelessness, unemployment, co-occurring dx, legal issues, CPS involvement, previous trauma, family relationship strains.  3. Principal DSM5 Diagnoses  (Sustained by DSM5 Criteria Listed Above):   296.33 (F33.2) Major Depressive Disorder, Recurrent Episode, Severe _  300.02 (F41.1) Generalized Anxiety Disorder with panic attacks.  4. Other Diagnoses that is relevant to services:per history of patient self report, PTSD, per history of patient self report, OCD, per history of patient self report, Social Anxiety,  Substance-Related & Addictive Disorders Alcohol Use Disorder   303.90 (F10.20) Severe,  Stimulant Use Disorder: current severity:  Mild  305.70 (F15.10) Amphetamine type substance.  5. Provisional Diagnosis:   6. Prognosis: Expect Improvement, Relieve Acute Symptoms and Maintain Current Status / Prevent Deterioration.  7. Likely consequences of symptoms if not treated: If untreated, patient's mental health will likely deteriorate and may require a higher level of care..  8. Patient's strengths/skills/abilities include:  has a previous history of therapy .   9. Patient's resources for support: her therapist, CPS investigator, shelter/crisis resources    Recommendations:     1. Plan for Safety and Risk Management:   A safety and risk management plan has been developed including: Patient consented to co-developed safety plan on 9/21/22.  Safety and risk management plan was reviewed.   Patient agreed to use safety plan should any safety concerns arise.  A copy was made available to the patient..          Report to child / adult protection services was NA.     2. Patient's identified miri / Denominational / spiritual influences will be incorporated into care by listening to needs and connecting with spiritual service as needed.     3. Initial Treatment will focus on: Depressed Mood - increase coping skills to reduce anxiety and depression. Anxiety - increase coping skills to reduce anxiety and depression.    Adjustment Difficulties related to: family concerns and unemployment  Alcohol / Substance Use - recovery skills  trauma.     4. Resources/Service Plan:    services are not indicated.   Modifications to assist communication are not indicated.   Additional disability accommodations are not indicated.      5. Collaboration:   Collaboration / coordination of treatment will be initiated with the following support professionals: CPS worker.      6.  Referrals:   The following referral(s) will be initiated (list in order of priority or patient preference):Residential treatment and could benefit from a  MI/CD or co-occurring Family Program such as [facility] or [facility].  Next Scheduled Appointment: TBD, referrals to both programs sent.     A Release of Information has been obtained for the following: CPS worker and treatment program referrals.    7.  OMAR: Recommendations:  1. Abstain from using all non-prescribed mood altering chemicals and substances. Take all medication as prescribed and directed by licensed physicians.   2.Complete a co-occurring/family residential treatment program such as [facility] or [facility] or similar program. Follow all recommendations and referrals made by treatment provider(s).  3. Comply with all legal obligations and referrals made by [facility] and CPS. Continue to comply with random drug screenings and breathalyzers from CPS.   4. Continue to follow all recommendations and referrals made by other providers such as medical providers.  5. Continue to attend sober support activities until in a residential program.  6. Continue with outpatient mental health individual therapy. Follow all recommendations and referrals made by therapist.     Clinical Substantiation  Summary: Patient has many risk factors environmentally that places her at high risk for continued use if not in a structured supportive housing treatment program that addresses her substance use and her mental health. Patient denied having a pcp at this time. Patient reports she is not on any prescribed medications at this time. Patient reports she has not been to a doctor recently. Patient reported having previous mental health diagnosis and reports working with an outpatient therapist once a week.    Patient reports her mental health symptoms do impact her ability to function. Patient has a long history of trauma and abuse.  The pt expresses a desire to be in recovery again and yet did not follow through on previous recommendations of treatment by the initial . Patient reports she is not  sure what to do as she cannot go into a shelter even with her anxiety. Patient appears to lack insight into the severity of her use and mental health symptoms, lacks impulse control, sober coping skills, and long-term sober maintenance skills. Patient reports she has been looking into treatment programs and has yet to attempt to get into programming. Patient reports her therapist encouraged [facility]. Patient has never been to treatment. Patient reports lack of stable living environment or sober support network. Patient is at a high risk for relapse/continued use.  Patient reports she is currently homeless and will no longer be able to stay with her son and his gilfriend after this week. Patient reports current pending legal issues. Patient reports current CPS case.  Patient reports she is attending sober support group meetings and therapy but continues to use and struggle with her mental health symptoms. Patient is currently unemployed and lacks structure support and routine. Patient reports she has her two young children to care for.  Patient would appear to benefit from structured programming due to these factors. Patient was initially recommended/met criteria for residential treatment but appeared to have more support and more stable housing at that time where  referred patient to [facility] per request of patient but patient did not respond to program regarding starting program back in April of 2022.   (clinical summary that indicates how clinician came to the LOC recommendation - likely includes symptoms, diagnosis, and risk assessment)    Placement/Program Barriers Identified: lack of transportation. Has two young children who she cares for. Lack of support to help care for her two young children.     Referral: Co-occurring residential treatment such as [facility] or [facility].  (include LOC recommended and where  "patient is referred to/plan for patient, and alternative placement option)       8. Records:   They were reviewed at time of assessment.   Information in this assessment was obtained from the medical record and provided by patient who is a fair historian.    Patient will have open access to their mental health medical record.            Outpatient Mental Health Services - Adult     MY COPING PLAN FOR SAFETY     PATIENT'S NAME:    Courtney Alaniz  MRN:                           [id number]     SAFETY PLAN:     Step 1: Warning signs / cues (Thoughts, images, mood, situation, behavior) that a crisis may be developing:     ? Thoughts: \"I can't do this anymore\", \"I just want this to end\" and \"Nothing makes it better\"  ? Images: flashbacks and visions of harm  ? Thinking Processes: ruminations (can't stop thinking about my problems), racing thoughts, intrusive thoughts (bothersome, unwanted thoughts that come out of nowhere) and highly critical and negative thoughts: of self  ? Mood: worsening depression, hopelessness, helplessness and intense worry  ? Behaviors: using drugs, using alcohol, can't stop crying, not taking care of myself, not taking care of my responsibilities, sleeping too much and not sleeping enough  ? Situations: pain, relationship problems, trauma , relapse and financial stress   ?    Step 2: Coping strategies - Things I can do to take my mind off of my problems without contacting another person (relaxation technique, physical activity):     ? Distress Tolerance Strategies:  sensory based activities/self-soothe with five senses and paced breathing/progressive muscle relaxation  ? Physical Activities: go for a walk  ? Focus on helpful thoughts:  remind myself of what is important to me: her children     Step 3: People and social settings that provide distraction:                 Name: Erica Westfall, [employer] [facility]        Phone: In her phone    Name: Nathan Alaniz (daughter), Tel: " [phone]              support group (i.e. twelve-step)      Step 4: Remind myself of people and things that are important to me and worth living for:  her children     Step 5: When I am in crisis, I can ask these people to help me use my safety plan:                 Name: Erica Westfall, [facility]        Phone: In her phone    Name: Nathan Alaniz (daughter), Tel: [phone]               Step 6: Making the environment safe:      ? remove alcohol, remove drugs, remove things I could use to hurt myself: scissors and be around others     Step 7: Professionals or agencies I can contact during a crisis:     ? Suicide Prevention Lifeline: 7-176-255-RUCG (9157)  ? Crisis Text Line Service (available 24 hours a day, 7 days a week): Text [place] to 986937  ? Call  **CRISIS (126582) from a cell phone to talk to a team of professionals who can help you.          Crisis Services By [facility]: Phone Number:   [place]     [phone]   [place]    [phone]   [facility]    [phone]   [place]    [phone]   [place]    [phone]   [place] [phone]   [place]     [phone]      ? Call 1 or go to my nearest emergency department.             I helped develop this safety plan and agree to use it when needed.  I have been given a copy of this plan.       Client signature _________________________________________________________________  Today s date:  9/21/2022  Adapted from Safety Plan Template 2008 Shell Haro and Vineet Medina is reprinted with the express permission of the authors.  No portion of the Safety Plan Template may be reproduced without the express, written permission.  You can contact the authors at [email or web address] or [email or web address]

## 2022-09-21 NOTE — PATIENT INSTRUCTIONS
Courtney,  We got you scheduled for a psychiatric appointment, see details below. I put some resources below as well. I will send out referrals to [facility] and [facility] for residential co-occurring treatment services. I will follow up with you once those are sent. The programs should also follow up with you on the referral and intake into their program directly. If you have questions feel free to reach out.    PSYCHIATRIC APPOINTMENT SCHEDULED  Date: Wednesday, 9/28/2022  Time: 2:00 pm - 3:00 pm  Provider: Raysa Fisher MD, MD  Location: [facility], [address], [address], [place] [address]  Phone: [phone]  Type: Telepsychiatry  Patient Instructions:Please fill New Patient Form by using following link. All forms need to be completed 48 hours prior to the appointment date/time by going to [email or web address]. Please call us at [phone] 72 hours prior to your scheduled appointment to confirm that you are able to attend. We will provide you information about how to log into video call software when you call.    RESOURCES  [facility] (free short term therapy)  Website: [email or web address]    [facility]   Website: [email or web address]  Telephone: [phone]  E-mail:[email or web address]    Professionals or agencies I can contact during a crisis:    Suicide Prevention Lifeline: 9-141-653-TALK (4327)  Crisis Text Line Service (available 24 hours a day, 7 days a week): Text [place] to 072668  Call  **CRISIS (922012) from a cell phone to talk to a team of professionals who can help you.    Crisis Services By County: Phone Number:   [place]     [phone]   [place]    [phone]   [facility]    [phone]   [place]    [phone]   [place]    [phone]   [place] [phone]   [place]     [phone]     Call 911 or go to my nearest emergency department.       [facility]  Website:  [email or web address]  Telephone: 1-565.144.2787    [facility]  Website:[email or web address]  Telephone: [phone]    Kristina Villa, [facility]ARD, [facility]  Licensed Psychotherapist  [facility]  Mental Health and Addiction Services Assessment Center [email or web address]  Office: [phone]  Fax: [phone]  Gender pronouns: she/her/hers  Employed by: [employer]

## 2022-09-21 NOTE — TELEPHONE ENCOUNTER
Patient have a video appointment today at 2pm with [facility]. Writer placed a call this afternoon to check in patient. Unable to get a hold of patient. Writer left a voicemail with writer's call back number.

## 2022-10-14 ENCOUNTER — VIRTUAL VISIT (OUTPATIENT)
Dept: URGENT CARE | Facility: CLINIC | Age: 42
End: 2022-10-14
Payer: COMMERCIAL

## 2022-10-14 DIAGNOSIS — N93.9 VAGINAL BLEEDING: Primary | ICD-10-CM

## 2022-10-14 PROCEDURE — 99207 PR NO CHARGE LOS: CPT

## 2022-10-14 NOTE — PROGRESS NOTES
I called and spoke with Courtney. She had 5 positive pregnancy test on October 6.  On October 8 she had a small amount of spotting and on the ninth began bleeding more heavily with clumps of tissue.  This discharge has a foul odor.  Now she has pain in her left lower back/flank.  We discussed my concern for a possible urinary tract infection/pyelonephritis or retained products of conception +/- infection.  She currently does not have a car and at home with 2 young children.  She is going to see if she can find childcare and vehicle.  She may be able to get into see the OB at 215 today, otherwise she should go to the emergency room as soon as she is able.  She is agreeable to this plan.    Chelsea Isaac PA-C  [facility]

## 2022-10-15 ENCOUNTER — HEALTH MAINTENANCE LETTER (OUTPATIENT)
Age: 42
End: 2022-10-15

## 2022-11-12 ENCOUNTER — E-VISIT (OUTPATIENT)
Dept: URGENT CARE | Facility: URGENT CARE | Age: 42
End: 2022-11-12
Payer: COMMERCIAL

## 2022-11-12 DIAGNOSIS — R31.9 HEMATURIA, UNSPECIFIED TYPE: Primary | ICD-10-CM

## 2022-11-18 NOTE — PATIENT INSTRUCTIONS
Dear Courtney Alaniz,    We are sorry you are not feeling well. Based on the responses you provided, it is recommended that you be seen in-person in urgent care so we can better evaluate your symptoms. Please click here to find the nearest urgent care location to you.   You will not be charged for this Visit. Thank you for trusting us with your care.    AMY Avery CNP

## 2022-11-28 ENCOUNTER — TELEPHONE (OUTPATIENT)
Dept: BEHAVIORAL HEALTH | Facility: CLINIC | Age: 42
End: 2022-11-28

## 2022-12-02 ENCOUNTER — TELEPHONE (OUTPATIENT)
Dept: BEHAVIORAL HEALTH | Facility: CLINIC | Age: 42
End: 2022-12-02

## 2022-12-02 NOTE — TELEPHONE ENCOUNTER
Patient have a video appointment today at 1pm with [facility]. Writer placed a call this afternoon to check in. Unable to get a hold of patient. Writer left a voicemail with writer's call back number.

## 2022-12-05 ENCOUNTER — TELEPHONE (OUTPATIENT)
Dept: BEHAVIORAL HEALTH | Facility: CLINIC | Age: 42
End: 2022-12-05

## 2022-12-05 NOTE — TELEPHONE ENCOUNTER
[facility] Follow-up  Behavioral Health Clinician Triage Service    [facility] Responses:  [facility] Follow-up to PHQ 9/21/2022 9/21/2022 12/2/2022   PHQ-9 9. Suicide Ideation past 2 weeks More than half the days Nearly every day Several days   Thoughts of suicide or self harm in past 2 weeks - Yes Yes   Thoughts of suicide or self harm in past 2 weeks Yes - Yes   PHQ-9 Self harm plan? - No Yes   PHQ-9 Self harm action? - No No   PHQ-9 Safety concerns? - No No   PHQ-9 Self harm plan? No - Yes   PHQ-9 Self harm action? No - No   PHQ-9 Safety concerns? No - No        1st Outreach Date: December 5, 2022 Time: 09:45 am  Outcome: Left a message for patient to call [facility].  If patient doesn't return the call the [facility] Pool will make one more phone attempt within 24 hours.  MyChart message sent as well.  RAOUL Newman  2nd Outreach Date: December 5, 2022 Time: 16:13  Outcome: Left a message.  See disposition below. [facility] attempted to reach patient at both her cell and home numbers. Will attempt contact again tomorrow.  Raoul Newman  3rd Outreach Date: 12/07/2022 at 14:40.  [facility] left another voicemail message and encouraged patient to call back.  A another MyChart message sent regarding contact and with crisis resources.    [facility] attempted to contact patient multiple times without success.  Patient did respond to a MyChart message that she is hesitant and unable to talk over the phone.  [facility] was out of the office due to illness on 12/06/2022 and coworker Denise attempted contact as well.  Patient does not have an established PCP that staff can consult with.  Patient does not have an emergency contact listed.  Patient had indicated that she is at a hotel and the name and location of the hotel are unknown.  Unable to call for a welfare check at this time.  Patient has been encouraged to reach out for contact and resources.    Due to a consultation with administration, the decision was made to pascual patient's previous  request to communicate through email and there is an active YOSI on record to communicate this way.  Patient was sent an email offering crisis resources and neck steps in order for patient to reach out and get rescheduled with appointments.  Patient was also encouraged to follow through and using the crisis resources.  Patient did respond to the email that she received the information.  Patient also responded to a [employer] message indicating that she would be available for a phone call on 12/08/2022 between 12-2 p.m. and again after 4 PM.  [facility] attempted to contact patient before 2 PM; however, a message indicated the phone number is no longer available and the call cannot be completed as styled.  [facility] attempted several times and received the same message.  This encounter will be closed.    Rodney Newman  CCPS [place] [facility] Clinician

## 2023-03-02 ENCOUNTER — VIRTUAL VISIT (OUTPATIENT)
Dept: FAMILY MEDICINE | Facility: CLINIC | Age: 43
End: 2023-03-02
Payer: COMMERCIAL

## 2023-03-02 DIAGNOSIS — F10.10 ALCOHOL USE DISORDER, MILD, ABUSE: Primary | ICD-10-CM

## 2023-03-02 DIAGNOSIS — F41.1 GENERALIZED ANXIETY DISORDER: ICD-10-CM

## 2023-03-02 DIAGNOSIS — F42.9 OBSESSIVE-COMPULSIVE DISORDER, UNSPECIFIED TYPE: ICD-10-CM

## 2023-03-02 DIAGNOSIS — F40.10 SOCIAL PHOBIA: ICD-10-CM

## 2023-03-02 PROCEDURE — 96127 BRIEF EMOTIONAL/BEHAV ASSMT: CPT | Mod: 95 | Performed by: FAMILY MEDICINE

## 2023-03-02 PROCEDURE — 99203 OFFICE O/P NEW LOW 30 MIN: CPT | Mod: 95 | Performed by: FAMILY MEDICINE

## 2023-03-02 ASSESSMENT — ANXIETY QUESTIONNAIRES
1. FEELING NERVOUS, ANXIOUS, OR ON EDGE: NEARLY EVERY DAY
7. FEELING AFRAID AS IF SOMETHING AWFUL MIGHT HAPPEN: NEARLY EVERY DAY
6. BECOMING EASILY ANNOYED OR IRRITABLE: NOT AT ALL
3. WORRYING TOO MUCH ABOUT DIFFERENT THINGS: NEARLY EVERY DAY
GAD7 TOTAL SCORE: 18
IF YOU CHECKED OFF ANY PROBLEMS ON THIS QUESTIONNAIRE, HOW DIFFICULT HAVE THESE PROBLEMS MADE IT FOR YOU TO DO YOUR WORK, TAKE CARE OF THINGS AT HOME, OR GET ALONG WITH OTHER PEOPLE: EXTREMELY DIFFICULT
5. BEING SO RESTLESS THAT IT IS HARD TO SIT STILL: NEARLY EVERY DAY
GAD7 TOTAL SCORE: 18
2. NOT BEING ABLE TO STOP OR CONTROL WORRYING: NEARLY EVERY DAY

## 2023-03-02 ASSESSMENT — PATIENT HEALTH QUESTIONNAIRE - PHQ9
5. POOR APPETITE OR OVEREATING: NEARLY EVERY DAY
SUM OF ALL RESPONSES TO PHQ QUESTIONS 1-9: 22

## 2023-03-02 NOTE — PROGRESS NOTES
Courtney is a 42 year old who is being evaluated via a billable telephone visit.      What phone number would you like to be contacted at? [phone]  How would you like to obtain your AVS? Deanna  Distant Location (provider location):  On-site    Assessment & Plan     Alcohol use disorder, mild, abuse  Working with county to try to find virtual treatment option. Briefly discussed possible medications to help curb cravings. Recommended establishing with addiction medicine/psych  - Primary Care - Care Coordination Referral  - Adult Mental Health  Referral    Generalized anxiety disorder  Continue meeting with counselor. Recommended meeting with psychiatry and establishing with PCP closely so can have ongoing relationship and discuss titration of meds. Has previously not tolerated multiple medications   - Primary Care - Care Coordination Referral  - Adult Mental Health  Referral    Obsessive-compulsive disorder, unspecified type  - Primary Care - Care Coordination Referral  - Adult Mental Health  Referral    Social phobia  - Primary Care - Care Coordination Referral  - Adult Mental Health  Referral       Depression Screening Follow Up    PHQ 3/2/2023   PHQ-9 Total Score 22   Q9: Thoughts of better off dead/self-harm past 2 weeks More than half the days   F/U: Thoughts of suicide or self-harm -   F/U: Self harm-plan -   F/U: Self-harm action -   F/U: Safety concerns -   Some encounter information is confidential and restricted. Go to Review Flowsheets activity to see all data.     Contracts for safety-because of her children she is motivated to seek treatment.       Follow Up      Follow Up Actions Taken  Crisis resource information provided in the After Visit Summary  Referred patient back to mental health provider    Discussed the following ways the patient can remain in a safe environment:  remove alcohol and be around others      Return in about 4 weeks (around 3/30/2023) for  Follow up.    DO ROSANA CARTAGENA [facility]    Syed Valdez is a 42 year old, presenting for the following health issues:  Depression    HPI     -Hx of depression, PtSD, social phobia, OCD, alcohol use disorder-sober for 4 years then 3 years ago relapsed. Has 2 young toddlers at home. Due to her anxiety makes it hard to leave the house. They are staying at hotel and working with county worker to help get into affordable housing. Seeing counselor weekly.     Depression Followup    How are you doing with your depression since your last visit? Worsened Used to be on medication in the past, would like to start on medication again    Are you having other symptoms that might be associated with depression? Yes:  Anxiety    Have you had a significant life event?  Housing Concerns, is living in hotels.  Has as lot going on     Are you feeling anxious or having panic attacks?   Yes:       Do you have any concerns with your use of alcohol or other drugs? Yes:  is an alcoholic    Social History     Tobacco Use     Smoking status: Never     Smokeless tobacco: Never   Vaping Use     Vaping Use: Never used   Substance Use Topics     Alcohol use: Yes     Comment: quit with pregnancy     Drug use: No     PHQ 2/25/2022 3/2/2023   PHQ-9 Total Score 24 22   Q9: Thoughts of better off dead/self-harm past 2 weeks More than half the days More than half the days   F/U: Thoughts of suicide or self-harm Yes -   F/U: Self harm-plan Yes -   F/U: Self-harm action No -   F/U: Safety concerns No -   Some encounter information is confidential and restricted. Go to Review Flowsheets activity to see all data.     LUZ-7 SCORE 2/25/2022 9/15/2022 3/2/2023   Total Score - - -   Total Score 20 (severe anxiety) 17 (severe anxiety) -   Total Score 20 - 18   Some encounter information is confidential and restricted. Go to Review Flowsheets activity to see all data.       Review of Systems   Constitutional, HEENT,  cardiovascular, pulmonary, gi and gu systems are negative, except as otherwise noted.      Objective           Vitals:  No vitals were obtained today due to virtual visit.    Physical Exam   RESP: No cough, no audible wheezing, able to talk in full sentences  Remainder of exam unable to be completed due to telephone visits            Phone call duration: 20 minutes

## 2023-03-03 ENCOUNTER — PATIENT OUTREACH (OUTPATIENT)
Dept: CARE COORDINATION | Facility: CLINIC | Age: 43
End: 2023-03-03
Payer: COMMERCIAL

## 2023-03-03 NOTE — PROGRESS NOTES
Clinic Care Coordination Contact  [facility]/Voicemail    Reason for Referral: Mental Wellness (Health) (Mental Illness/Chemical Dependency)    Resources for Transportation   Mental Wellness: Resources of Behavioral Health Services    Treatment Options     Referral notes: Pt was previously involved with [facility] mental [facility] - seemed to drop off. New order placed by PCP - pt needs to call to schedule for alchohol treatment resources; can also schedule therapy at this #. [phone]    Referral Source:  (Kierra Weinberg, )  Clinical Data: Care Coordination Outreach  Outreach attempted x 1.  Left message on patient's voicemail with call back information and requested return call.    CHW also left # to schedule therapy. [phone]    Plan: CHW will try to reach patient again in 1-2 business days.    Brianda LAWRENCE  Community Health Worker  [facility]  Clinic Care Coordination  [facility]  [email or web address]   Office: [phone]

## 2023-03-04 ENCOUNTER — E-VISIT (OUTPATIENT)
Dept: URGENT CARE | Facility: CLINIC | Age: 43
End: 2023-03-04
Payer: COMMERCIAL

## 2023-03-04 DIAGNOSIS — R09.81 NASAL CONGESTION: ICD-10-CM

## 2023-03-04 DIAGNOSIS — J06.9 VIRAL URI: ICD-10-CM

## 2023-03-04 PROCEDURE — 99421 OL DIG E/M SVC 5-10 MIN: CPT | Performed by: NURSE PRACTITIONER

## 2023-03-04 RX ORDER — AZELASTINE 1 MG/ML
1 SPRAY, METERED NASAL 2 TIMES DAILY
Qty: 30 ML | Refills: 0 | Status: SHIPPED | OUTPATIENT
Start: 2023-03-04

## 2023-03-05 NOTE — PATIENT INSTRUCTIONS
The symptoms you describe suggest a viral cause, which is much more common than a bacterial cause. Antibiotics will treat bacterial infections, but have no effect on viral infections. If possible, especially if improving, start with symptom care for the first 7-10 days, then consider seeking further treatment or taking an antibiotic. Bacterial infections generally are more severe, including symptoms such as pus, fever over 101degrees F, or rapidly worsening.  Dear Courtney Alaniz    After reviewing your responses, I've been able to diagnose you with nasal congestion and viral illness      Based on your responses and diagnosis, I have prescribed Astelin nasal spray    to treat your symptoms. I have sent this to your pharmacy.? It will help with your congestion. You may need to come in if not better in a week to look at your nose.     It is also important to stay well hydrated, get lots of rest and take over-the-counter decongestants,?tylenol?or ibuprofen if you?are able to?take those medications per your primary care provider to help relieve discomfort.?     If your symptoms worsen, you develop severe headache, vomiting, high fever (>102), or are not improving in 7 days, please contact your primary care provider for an appointment or visit any of our convenient Walk-in Care or Urgent Care Centers to be seen which can be found on our website?here.?     Thanks again for choosing?us?as your health care partner,?   ?  Hyacinth Phelan, BATOOL?

## 2023-03-06 NOTE — PROGRESS NOTES
Clinic Care Coordination Contact  [facility]/Voicemail    Referral Source:  (Kierra Weinberg, DO)  Clinical Data: Care Coordinator Outreach  Outreach attempted x 2.  Left message on patient's voicemail with call back information and requested return call.  Plan: Care Coordinator will send care coordination introduction letter with care coordinator contact information and explanation of care coordination services via [employer]. Care Coordinator will do no further outreaches at this time.    AMMON Sharma  [facility] Care Coordination  [facility]  [phone]

## 2023-03-26 ENCOUNTER — HEALTH MAINTENANCE LETTER (OUTPATIENT)
Age: 43
End: 2023-03-26

## 2023-04-14 ENCOUNTER — E-VISIT (OUTPATIENT)
Dept: URGENT CARE | Facility: CLINIC | Age: 43
End: 2023-04-14
Payer: COMMERCIAL

## 2023-04-14 DIAGNOSIS — R06.2 WHEEZING: Primary | ICD-10-CM

## 2023-04-14 DIAGNOSIS — B96.89 ACUTE BACTERIAL SINUSITIS: ICD-10-CM

## 2023-04-14 DIAGNOSIS — J01.90 ACUTE BACTERIAL SINUSITIS: ICD-10-CM

## 2023-04-14 PROCEDURE — 99421 OL DIG E/M SVC 5-10 MIN: CPT | Performed by: PHYSICIAN ASSISTANT

## 2023-04-14 RX ORDER — ALBUTEROL SULFATE 90 UG/1
2 AEROSOL, METERED RESPIRATORY (INHALATION) EVERY 6 HOURS
Qty: 18 G | Refills: 0 | Status: SHIPPED | OUTPATIENT
Start: 2023-04-14

## 2023-04-14 NOTE — PATIENT INSTRUCTIONS
Sinusitis (Antibiotic Treatment)    The sinuses are air-filled spaces within the bones of the face. They connect to the inside of the nose. Sinusitis is an inflammation of the tissue that lines the sinuses. Sinusitis can occur during a cold. It can also happen due to allergies to pollens and other particles in the air. Sinusitis can cause symptoms of sinus congestion and a feeling of fullness. A sinus infection causes fever, headache, and facial pain. There is often green or yellow fluid draining from the nose or into the back of the throat (post-nasal drip). You have been given antibiotics to treat this condition.   Home care    Take the full course of antibiotics as instructed. Don't stop taking them, even when you feel better.    Drink plenty of water, hot tea, and other liquids as directed by the healthcare provider. This may help thin nasal mucus. It also may help your sinuses drain fluids.    Heat may help soothe painful areas of your face. Use a towel soaked in hot water. Or,  the shower and direct the warm spray onto your face. Using a vaporizer along with a menthol rub at night may also help soothe symptoms.     An expectorant with guaifenesin may help thin nasal mucus and help your sinuses drain fluids. Talk with your provider or pharmacists before taking an over-the-counter (OTC) medicine if you have any questions about it or its side effects..    You can use an OTC decongestant, unless a similar medicine was prescribed to you. Nasal sprays work the fastest. Use one that contains phenylephrine or oxymetazoline. First blow your nose gently. Then use the spray. Don't use these medicines more often than directed on the label. If you do, your symptoms may get worse. You may also take pills that contain pseudoephedrine. Don t use products that combine multiple medicines. This is because side effects may be increased. Read labels. You can also ask the pharmacist for help. (People with high blood  pressure should not use decongestants. They can raise blood pressure.) Talk with your provider or pharmacist if you have any questions about the medicine..    OTC antihistamines may help if allergies contributed to your sinusitis. Talk with your provider or pharmacist if you have any questions about the medicine.    Nasal rinses or irrigation may help symptoms. It's very important to use these products only as directed. Use sterile water or sterile saline solution and not tap water. Tap water may contain germs that can cause infection in the brain. Don't rinse with excess pressure. this may spread the infection to other areas in your sinuses or head. Ask your healthcare provider or pharmacist if you have questions about using these products.    Use acetaminophen, naproxen, or ibuprofen to control pain, unless another pain medicine was prescribed to you. Talk with your healthcare provider before using these medicines if you have chronic liver or kidney disease or ever had a stomach ulcer. Never give aspirin to anyone under age 18 without first talking to their healthcare provider. It may cause severe liver damage.    Don't smoke. This can make symptoms worse.    Follow-up care  Follow up with your healthcare provider as advised.   When to seek medical advice  Call your healthcare provider if any of these occur:     Facial pain or headache that gets worse    Symptoms don't go away in 10 days    Fever of 100.4 F (38 C) or higher, or as directed by your healthcare provider  Call 911  Call 911 if any of these occur:     Seizure    Trouble breathing    Feeling dizzy or faint    Fingernails, skin, or lips look blue, purple, or gray    Severe headache that doesn't go away    Stiff neck    Unusual drowsiness or confusion    Vision problems such as blurred or double vision    Swelling of your forehead or eyelids  Prevention  Here are steps you can take to help prevent an infection:     Keep good hand washing habits.    Don t  have close contact with people who have sore throats, colds, or other upper respiratory infections.    Don t smoke, and stay away from secondhand smoke.    Stay up to date with all of your vaccines.  [employer] last reviewed this educational content on 1/1/2022 2000-2022 [employer]. All rights reserved. This information is not intended as a substitute for professional medical care. Always follow your healthcare professional's instructions.        Dear Courtney Alaniz    After reviewing your responses, I've been able to diagnose you with    Wheezing  Acute bacterial sinusitis.      Based on your responses and diagnosis, I have prescribed   Orders Placed This Encounter     amoxicillin-clavulanate (AUGMENTIN) 875-125 MG tablet    to treat your symptoms. I have sent this to your pharmacy.?     It is also important to stay well hydrated, get lots of rest and take over-the-counter decongestants,?tylenol?or ibuprofen if you?are able to?take those medications per your primary care provider to help relieve discomfort.?     It is important that you take?all of?your prescribed medication even if your symptoms are improving after a few doses.? Taking?all of?your medicine helps prevent the symptoms from returning.?     If your symptoms worsen, you develop severe headache, vomiting, high fever (>102), or are not improving in 7 days, please contact your primary care provider for an appointment or visit any of our convenient Walk-in Care or Urgent Care Centers to be seen which can be found on our website?here.?     Thanks again for choosing?us?as your health care partner,?   ?  Jovanni Reyes, [facility], PA-C?

## 2023-05-09 ENCOUNTER — TELEPHONE (OUTPATIENT)
Dept: OBGYN | Facility: CLINIC | Age: 43
End: 2023-05-09
Payer: COMMERCIAL

## 2023-05-09 NOTE — LETTER
May 9, 2023      Courtney Alaniz  [address]  [place] [address]              Dear Courtney,    To ensure we are providing the best quality care, we have reviewed your chart and see that you are due for:    Cervical Cancer Screening:    Please call Dept: [phone] to schedule an Annual Exam with Pap Smear.    If you have completed the tests outside of [facility], please have the results forwarded to our office Fax # [phone]. We will update the chart for your primary Physician to review before your next annual physical.    Thank you for trusting us with your health care.        Sincerely,      Tanika Cifuentes MD

## 2023-05-09 NOTE — TELEPHONE ENCOUNTER
Panel Management Review        Health Maintenance List    Health Maintenance   Topic Date Due     YEARLY PREVENTIVE VISIT  Never done     ADVANCE CARE PLANNING  Never done     HEPATITIS B IMMUNIZATION (1 of 3 - 3-dose series) Never done     COVID-19 Vaccine (1) Never done     Pneumococcal Vaccine: Pediatrics (0 to 5 Years) and At-Risk Patients (6 to 64 Years) (1 - PCV) Never done     DTAP/TDAP/TD IMMUNIZATION (3 - Td or Tdap) 2021     INFLUENZA VACCINE (1) 2022     HPV TEST  10/13/2022     PAP  10/13/2022     PHQ-9  2023     HEPATITIS C SCREENING  Completed     HIV SCREENING  Completed     DEPRESSION ACTION PLAN  Completed     IPV IMMUNIZATION  Aged Out     MENINGITIS IMMUNIZATION  Aged Out       Composite cancer screening  Chart review shows that this patient is due/due soon for the following Pap Smear  No results found for: PAP  Past Surgical History:   Procedure Laterality Date     BACK SURGERY      Age 15 for bone spur      BIOPSY CERVICAL, LOCAL EXCISION, SINGLE/MULTIPLE        SECTION      2018 for abruption       SECTION N/A 10/4/2019    Procedure: Repeat  section;  Surgeon: Tanika Cifuentes MD;  Location: [facility]      SECTION N/A 2018    Procedure:  SECTION;  Surgeon: Becky Lal MD;  Location: [facility];  Service:      SPINE SURGERY      Pt reports surgery at [facility] in mid  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 No   Is mastectomy listed in surgical history? No     Summary:    Patient is due/failing the following:   Pap Smear    Action needed: Patient needs office visit for Annual Exam with pap smear.    Type of outreach:  Sent [employer] message.      Staff Signature:  Terrie Smith CMA

## 2024-01-03 NOTE — TELEPHONE ENCOUNTER
parking no longer available.   Provider E-Visit time total (minutes): 3

## 2024-02-19 ENCOUNTER — MYC MEDICAL ADVICE (OUTPATIENT)
Dept: FAMILY MEDICINE | Facility: CLINIC | Age: 44
End: 2024-02-19
Payer: MEDICAID

## 2024-02-19 NOTE — TELEPHONE ENCOUNTER
Patient Quality Outreach    Patient is due for the following:   Breast Cancer Screening - Mammogram  Cervical Cancer Screening - PAP Needed  Physical Preventive Adult Physical    Next Steps:   Schedule a Adult Preventative    Type of outreach:    Sent [employer] message.      Questions for provider review:    None           Brea Giles, CMA

## 2024-03-17 ENCOUNTER — HEALTH MAINTENANCE LETTER (OUTPATIENT)
Age: 44
End: 2024-03-17

## 2024-04-07 ENCOUNTER — HOSPITAL ENCOUNTER (EMERGENCY)
Facility: CLINIC | Age: 44
Discharge: HOME | End: 2024-04-07
Attending: STUDENT IN AN ORGANIZED HEALTH CARE EDUCATION/TRAINING PROGRAM | Admitting: STUDENT IN AN ORGANIZED HEALTH CARE EDUCATION/TRAINING PROGRAM
Payer: MEDICAID

## 2024-04-07 VITALS
HEART RATE: 106 BPM | DIASTOLIC BLOOD PRESSURE: 86 MMHG | OXYGEN SATURATION: 98 % | BODY MASS INDEX: 19.08 KG/M2 | WEIGHT: 107.69 LBS | RESPIRATION RATE: 16 BRPM | TEMPERATURE: 98 F | HEIGHT: 63 IN | SYSTOLIC BLOOD PRESSURE: 153 MMHG

## 2024-04-07 DIAGNOSIS — Z32.02 PREGNANCY EXAMINATION OR TEST, NEGATIVE RESULT: ICD-10-CM

## 2024-04-07 PROBLEM — F19.11 HISTORY OF SUBSTANCE ABUSE (H): Status: ACTIVE | Noted: 2018-04-17

## 2024-04-07 PROBLEM — F32.A DEPRESSIVE DISORDER: Status: ACTIVE | Noted: 2024-04-07

## 2024-04-07 PROBLEM — D64.9 ANEMIA: Status: ACTIVE | Noted: 2023-10-28

## 2024-04-07 PROBLEM — O98.819 CHLAMYDIA INFECTION AFFECTING PREGNANCY: Status: ACTIVE | Noted: 2018-04-17

## 2024-04-07 PROBLEM — O09.30 INSUFFICIENT PRENATAL CARE: Status: ACTIVE | Noted: 2018-04-17

## 2024-04-07 PROBLEM — R56.9 SEIZURE (H): Status: ACTIVE | Noted: 2023-10-28

## 2024-04-07 PROBLEM — R73.9 HYPERGLYCEMIA: Status: ACTIVE | Noted: 2023-10-29

## 2024-04-07 PROBLEM — R87.613 HGSIL (HIGH GRADE SQUAMOUS INTRAEPITHELIAL LESION) ON PAP SMEAR OF CERVIX: Status: ACTIVE | Noted: 2017-11-19

## 2024-04-07 PROBLEM — O09.529 ADVANCED MATERNAL AGE IN MULTIGRAVIDA: Status: ACTIVE | Noted: 2017-09-08

## 2024-04-07 PROBLEM — R63.6 UNDERWEIGHT: Status: ACTIVE | Noted: 2023-10-28

## 2024-04-07 PROBLEM — O99.019 ANEMIA AFFECTING PREGNANCY: Status: ACTIVE | Noted: 2018-04-17

## 2024-04-07 PROBLEM — A74.9 CHLAMYDIA INFECTION AFFECTING PREGNANCY: Status: ACTIVE | Noted: 2018-04-17

## 2024-04-07 PROBLEM — E87.1 HYPONATREMIA: Status: ACTIVE | Noted: 2023-10-28

## 2024-04-07 PROBLEM — K92.2 GI BLEED: Status: ACTIVE | Noted: 2023-10-31

## 2024-04-07 PROBLEM — F10.939 ALCOHOL WITHDRAWAL (H): Status: ACTIVE | Noted: 2023-10-28

## 2024-04-07 PROBLEM — E83.42 HYPOMAGNESEMIA: Status: ACTIVE | Noted: 2023-10-28

## 2024-04-07 PROBLEM — E87.6 HYPOKALEMIA: Status: ACTIVE | Noted: 2023-10-28

## 2024-04-07 PROBLEM — F15.20 METHAMPHETAMINE USE DISORDER, MODERATE (H): Status: ACTIVE | Noted: 2023-10-28

## 2024-04-07 PROBLEM — K02.9 DENTAL CARIES: Status: ACTIVE | Noted: 2023-10-28

## 2024-04-07 PROBLEM — M48.02 CERVICAL SPINAL STENOSIS: Status: ACTIVE | Noted: 2023-10-28

## 2024-04-07 PROBLEM — Z79.891 CHRONIC PRESCRIPTION OPIATE USE: Status: ACTIVE | Noted: 2018-04-17

## 2024-04-07 PROBLEM — G89.29 CHRONIC LOWER BACK PAIN: Status: ACTIVE | Noted: 2018-04-17

## 2024-04-07 PROBLEM — G43.009 MIGRAINE WITHOUT AURA: Status: ACTIVE | Noted: 2024-04-07

## 2024-04-07 PROBLEM — R79.89 ELEVATED LFTS: Status: ACTIVE | Noted: 2023-10-28

## 2024-04-07 PROBLEM — M54.50 CHRONIC LOWER BACK PAIN: Status: ACTIVE | Noted: 2018-04-17

## 2024-04-07 LAB — HCG INTACT+B SERPL-ACNC: <1 MIU/ML

## 2024-04-07 PROCEDURE — 84702 CHORIONIC GONADOTROPIN TEST: CPT | Performed by: STUDENT IN AN ORGANIZED HEALTH CARE EDUCATION/TRAINING PROGRAM

## 2024-04-07 PROCEDURE — 36415 COLL VENOUS BLD VENIPUNCTURE: CPT | Performed by: STUDENT IN AN ORGANIZED HEALTH CARE EDUCATION/TRAINING PROGRAM

## 2024-04-07 PROCEDURE — 99283 EMERGENCY DEPT VISIT LOW MDM: CPT | Performed by: STUDENT IN AN ORGANIZED HEALTH CARE EDUCATION/TRAINING PROGRAM

## 2024-04-07 ASSESSMENT — COLUMBIA-SUICIDE SEVERITY RATING SCALE - C-SSRS
6. HAVE YOU EVER DONE ANYTHING, STARTED TO DO ANYTHING, OR PREPARED TO DO ANYTHING TO END YOUR LIFE?: NO
1. IN THE PAST MONTH, HAVE YOU WISHED YOU WERE DEAD OR WISHED YOU COULD GO TO SLEEP AND NOT WAKE UP?: NO
2. HAVE YOU ACTUALLY HAD ANY THOUGHTS OF KILLING YOURSELF IN THE PAST MONTH?: NO

## 2024-04-07 ASSESSMENT — ACTIVITIES OF DAILY LIVING (ADL): ADLS_ACUITY_SCORE: 33

## 2024-04-08 NOTE — ED TRIAGE NOTES
Pt presents with concerns about being pregnant. Pt has had 10 pregnancies 4 live births. Would like blood pregnancy test. Unsure of LMP.      Triage Assessment (Adult)       Row Name 04/07/24 5122          Triage Assessment    Airway WDL WDL        Respiratory WDL    Respiratory WDL WDL        Skin Circulation/Temperature WDL    Skin Circulation/Temperature WDL WDL        Cardiac WDL    Cardiac WDL WDL        Peripheral/Neurovascular WDL    Peripheral Neurovascular WDL WDL        Cognitive/Neuro/Behavioral WDL    Cognitive/Neuro/Behavioral WDL WDL
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
## 2024-05-26 ENCOUNTER — HEALTH MAINTENANCE LETTER (OUTPATIENT)
Age: 44
End: 2024-05-26

## 2024-06-26 ENCOUNTER — TELEPHONE (OUTPATIENT)
Dept: FAMILY MEDICINE | Facility: CLINIC | Age: 44
End: 2024-06-26

## 2024-06-26 ENCOUNTER — NURSE TRIAGE (OUTPATIENT)
Dept: NURSING | Facility: CLINIC | Age: 44
End: 2024-06-26

## 2024-06-26 ENCOUNTER — E-VISIT (OUTPATIENT)
Dept: FAMILY MEDICINE | Facility: CLINIC | Age: 44
End: 2024-06-26

## 2024-06-26 DIAGNOSIS — F41.1 GENERALIZED ANXIETY DISORDER: Primary | ICD-10-CM

## 2024-06-26 PROCEDURE — 99207 PR NON-BILLABLE SERV PER CHARTING: CPT | Performed by: FAMILY MEDICINE

## 2024-06-26 ASSESSMENT — ANXIETY QUESTIONNAIRES
1. FEELING NERVOUS, ANXIOUS, OR ON EDGE: NEARLY EVERY DAY
8. IF YOU CHECKED OFF ANY PROBLEMS, HOW DIFFICULT HAVE THESE MADE IT FOR YOU TO DO YOUR WORK, TAKE CARE OF THINGS AT HOME, OR GET ALONG WITH OTHER PEOPLE?: SOMEWHAT DIFFICULT
5. BEING SO RESTLESS THAT IT IS HARD TO SIT STILL: NOT AT ALL
7. FEELING AFRAID AS IF SOMETHING AWFUL MIGHT HAPPEN: NOT AT ALL
3. WORRYING TOO MUCH ABOUT DIFFERENT THINGS: NEARLY EVERY DAY
GAD7 TOTAL SCORE: 13
GAD7 TOTAL SCORE: 13
2. NOT BEING ABLE TO STOP OR CONTROL WORRYING: NEARLY EVERY DAY
4. TROUBLE RELAXING: NEARLY EVERY DAY
7. FEELING AFRAID AS IF SOMETHING AWFUL MIGHT HAPPEN: NOT AT ALL
IF YOU CHECKED OFF ANY PROBLEMS ON THIS QUESTIONNAIRE, HOW DIFFICULT HAVE THESE PROBLEMS MADE IT FOR YOU TO DO YOUR WORK, TAKE CARE OF THINGS AT HOME, OR GET ALONG WITH OTHER PEOPLE: SOMEWHAT DIFFICULT
6. BECOMING EASILY ANNOYED OR IRRITABLE: SEVERAL DAYS
GAD7 TOTAL SCORE: 13

## 2024-06-26 ASSESSMENT — PATIENT HEALTH QUESTIONNAIRE - PHQ9
SUM OF ALL RESPONSES TO PHQ QUESTIONS 1-9: 19
10. IF YOU CHECKED OFF ANY PROBLEMS, HOW DIFFICULT HAVE THESE PROBLEMS MADE IT FOR YOU TO DO YOUR WORK, TAKE CARE OF THINGS AT HOME, OR GET ALONG WITH OTHER PEOPLE: SOMEWHAT DIFFICULT
SUM OF ALL RESPONSES TO PHQ QUESTIONS 1-9: 19

## 2024-06-26 NOTE — TELEPHONE ENCOUNTER
Nurse Triage SBAR    Is this a 2nd Level Triage? NO    Situation: future alcohol withdrawal concern    Background: social anxiety, history of alcohol abuse, wants to stop drinking, drinks around 3 shots a day    Assessment: During triage patient hung up    Protocol Recommended Disposition:   Unable to finish triage    Recommendation:  Unable to provide recommendation due to not being able to finish triage, called patient back and left a message      Additional Information   Negative: Coma (e.g., not moving, not talking, not responding to stimuli)   Negative: Difficult to awaken or acting confused (e.g., disoriented, slurred speech)   Negative: Seeing, hearing, or feeling things that are not there (i.e., visual, auditory, or tactile hallucinations)   Negative: Slow, shallow and weak breathing   Negative: Seizure    Protocols used: Alcohol Use and [id number]

## 2024-06-26 NOTE — TELEPHONE ENCOUNTER
Please see phone note.    Left message on answering machine for patient to call back.    Thank you    Hyacinth FLOR RN

## 2024-06-26 NOTE — TELEPHONE ENCOUNTER
Provider asked RN to reach out to patient regarding concerning evisit.    Writer attempted to patient and left a message.  Contacting the provider for further advise.    Thank you    Hyacinth FLOR RN

## 2024-06-26 NOTE — TELEPHONE ENCOUNTER
Provider E-Visit time total (minutes): 2    Are we able to call and triage this patient?-she reports alcohol withdrawal symptoms-if severe will likely need ER/evaluation this evening.    FRANCO BAILON, DO

## 2024-06-26 NOTE — TELEPHONE ENCOUNTER
Attempted to reach patient again. No answer left message.  Provider suggested welfare check. [facility] was notified and will do a welfare check.    Thank you    Hyacinth FLOR RN

## 2024-06-27 ASSESSMENT — PATIENT HEALTH QUESTIONNAIRE - PHQ9: SUM OF ALL RESPONSES TO PHQ QUESTIONS 1-9: 19

## 2024-06-27 NOTE — PATIENT INSTRUCTIONS
Hai Valdez,    I am really sorry you are dealing with this, could we please do a telephone visit to discuss your symptoms?  Thank you for choosing us for your care. I think an in-clinic or virtual visit would be the best next step based on your symptoms. Please schedule this appointment; you won t be charged for this eVisit.      You can schedule an appointment by clicking here in iVideosongs, or call [phone].            Crisis Resources       [facility] CRISIS LINE: [phone]       If you are in crisis, please take good care of yourself and consider accessing the resources available to you:       Steps to care for yourself       1. If you are currently in therapy, call your therapist for an appointment   2. Call the local crisis resources below if needed.   3. Contact friends or family for support.   4. Get more exercise.   5. Do activities you enjoy.   6. Eat a well-balanced diet and drink plenty of fluids.   7. Rest as needed. Get good sleep.   8. Limit or discontinue alcohol and recreational drugs.       When to contact your primary care provider         You have thoughts of harming or killing yourself but have not made a plan to carry it out.     Your depression gets in the way of daily activities.     You are unable to sleep.     You need help cutting back on alcohol or recreational drugs.       When to call 911 or go to the Emergency Room       Get emergency help right away if you have any of the following:     You are planning to harm or kill yourself and you have a way to carry out the plan.     You have injured yourself or others. Or, you think you will.     You feel confused or are having trouble thinking or remembering.     You are having delusions (false beliefs).     You are hearing voices or seeing things that aren't there.     You are feeling psychotic (paranoid, fearful, restless, agitated, nervous, racing thoughts or speech)       Crisis Resources       The telephone number for the  [facility] is [phone]       These hotlines are for both adults and children. The and are open 24 hours a day, 7 days a week unless noted otherwise.       National Suicide Prevention Lifeline   8-255-805-TALK (9952)     In case of life-threatening emergency, call 911 or go to the emergency department.     Call the National Suicide Prevention Lifeline 3-206-621-TALK (9233) to be connected to a counselor at a crisis center in your area if you, a family member, or friend are experiencing thoughts of suicide. The call is FREE, confidential, and always available.     Crisis Text Line: Text HOME to 206230 to connect with a Crisis Counselor. Free 24/7 support.       Crisis Text Line                        www.crisistextline.org   Text HOME to 952653 from anywhere in the [place], anytime, about any type of crisis. A live, trained crisis counselor will receive the text and respond quickly.     For [facility] crisis numbers, visit the [facility] website at:   https://[place].[facility]/dhs/people-we-serve/adults/health-care/mental-health/resources/crisis-contacts.jsp       Other Crisis Information:     [facility] Helpline: The toll free number is (231) 379-2065. The [facility] Helpline connects callers to financial help, mental health counselors, legal assistance, and more.       [place] Crisis Teams: Crisis teams, made up of mental health professionals, can travel to an individual's location (home, school, or other public areas) and assess the situation. They provide stabilization services, intervention services, crisis prevention planning, referral to other professionals, and follow-up services.The crisis teams are available by phone 24 hours a day, seven days a week. Call the team in your area:     [facility]: [phone]     [facility]: [phone]     [facility]: [phone]     [facility]: [phone]     [place]: adults - [phone], children - [phone]     [facility]: [phone]     [place]: adults - [phone], children - [phone]     General Mental Health Resources:     National [place] on Mental Illness of [place] ([facility]) provides support groups and educational programs. Visit www.namihelps.org or call the [facility] Helpline at 1-692.856.9623 or 421-880-2447 for further information.     Substance Abuse and Mental Health Services Administration - visit www.samhsa.gov     FRANCO BAILON DO

## 2024-06-27 NOTE — TELEPHONE ENCOUNTER
Left 2nd message for pt to call back care team so that we can assist with scheduling appt    {Please send feedback regarding eVisits to [email or web address]       Cosentyx Counseling:  I discussed with the patient the risks of Cosentyx including but not limited to worsening of Crohn's disease, immunosuppression, allergic reactions and infections.  The patient understands that monitoring is required including a PPD at baseline and must alert us or the primary physician if symptoms of infection or other concerning signs are noted.

## 2024-06-28 ENCOUNTER — TELEPHONE (OUTPATIENT)
Dept: FAMILY MEDICINE | Facility: CLINIC | Age: 44
End: 2024-06-28

## 2024-06-28 ENCOUNTER — VIRTUAL VISIT (OUTPATIENT)
Dept: FAMILY MEDICINE | Facility: CLINIC | Age: 44
End: 2024-06-28
Payer: MEDICAID

## 2024-06-28 DIAGNOSIS — F40.10 SOCIAL PHOBIA: ICD-10-CM

## 2024-06-28 DIAGNOSIS — F10.20 ALCOHOL USE DISORDER, SEVERE, DEPENDENCE (H): Primary | ICD-10-CM

## 2024-06-28 DIAGNOSIS — F42.9 OBSESSIVE-COMPULSIVE DISORDER, UNSPECIFIED TYPE: ICD-10-CM

## 2024-06-28 DIAGNOSIS — Z59.819 HOUSING INSECURITY: ICD-10-CM

## 2024-06-28 PROCEDURE — 99214 OFFICE O/P EST MOD 30 MIN: CPT | Mod: 95 | Performed by: FAMILY MEDICINE

## 2024-06-28 RX ORDER — VENLAFAXINE HYDROCHLORIDE 37.5 MG/1
37.5 CAPSULE, EXTENDED RELEASE ORAL DAILY
Qty: 30 CAPSULE | Refills: 1 | Status: SHIPPED | OUTPATIENT
Start: 2024-06-28 | End: 2024-06-28

## 2024-06-28 RX ORDER — VENLAFAXINE HYDROCHLORIDE 37.5 MG/1
37.5 CAPSULE, EXTENDED RELEASE ORAL DAILY
Qty: 30 CAPSULE | Refills: 1 | Status: SHIPPED | OUTPATIENT
Start: 2024-06-28 | End: 2024-07-18

## 2024-06-28 SDOH — ECONOMIC STABILITY - HOUSING INSECURITY: HOUSING INSTABILITY UNSPECIFIED: Z59.819

## 2024-06-28 NOTE — PROGRESS NOTES
Courtney is a 44 year old who is being evaluated via a billable video visit.    How would you like to obtain your AVS? MyChart  If the video visit is dropped, the invitation should be resent by: Text to cell phone:   Will anyone else be joining your video visit? No      Assessment & Plan     Alcohol use disorder, severe, dependence (H)  Patient has longstanding alcohol use, recently had legal trouble with DUI and unfortunately CPS has removed her children from her household.  She is motivated to cut back on alcohol.  I do think with her history of alcohol withdrawal seizures she will need to be in a controlled and monitored setting.  I think she would be a great candidate for an inpatient detox.  I have placed referral to [facility] to help facilitate this.  I would like her to get CMP done then we can start low-dose naltrexone to help With cravings - Adult Mental Health  Referral  - Comprehensive metabolic panel (BMP + Alb, Alk Phos, ALT, AST, Total. Bili, TP)  - Primary Care - Care Coordination Referral    Obsessive-compulsive disorder, unspecified type  unfortunately she has had a lapse in her insurance and is not able to see her counselor whom she is known for many years- referral to social work to help facilitate getting her insurance back on track and getting back in with her counselor.  Plan to restart her low-dose venlafaxine in the interim.  Close follow-up in 2 weeks  - Adult Mental Health  Referral  - Primary Care - Care Coordination Referral    Social phobia  This had made in office visits difficult.  She also has some transportation and housing insecurity  - Adult Mental Health  Referral  - Primary Care - Care Coordination Referral    Housing insecurity  - Adult Mental Health  Referral                Subjective   Courtney is a 44 year old, presenting for the following health issues:  Depression        6/28/2024    11:57 AM   Additional Questions   Roomed by  Nancy CASIANO   Accompanied by self     Video Start Time: 2:06 PM    HPI     Abnormal Mood Symptoms  Onset/Duration:   Description:   Depression (if yes, do PHQ-9): YES  Anxiety (if yes, do LUZ-7): YES  Accompanying Signs & Symptoms:  Still participating in activities that you used to enjoy: No  Fatigue: YES  Irritability: YES- sometimes  Difficulty concentrating: YES- sometimes  Changes in appetite: YES- decrease  Problems with sleep: YES- hard to stay asleep  Heart racing/beating fast: YES  Abnormally elevated, expansive, or irritable mood: No  Persistently increased activity or energy: No  Thoughts of hurting yourself or others: YES- in the past  History:  Recent stress or major life event: YES, recently lost kids (did not specify what that means), unable to see therapist for past 4 months due to insurance pending  Prior depression or anxiety: Yes  Family history of depression or anxiety: YES  Alcohol/drug use: YES- need help with alcohol dependency   Difficulty sleeping: YES  Precipitating or alleviating factors: None  Therapies tried and outcome: none, individual therapy, and medication(s) (hard to hear patient what she has taken in the past but she did say that medication has been helpful in the past    Rule 25 assesment 1-2 months ago in [facility]         12/2/2022    12:32 PM 3/2/2023     5:05 PM 6/26/2024    11:57 AM   PHQ   PHQ-9 Total Score 14 22 19   Q9: Thoughts of better off dead/self-harm past 2 weeks Several days More than half the days Several days   F/U: Thoughts of suicide or self-harm Yes  No   F/U: Self harm-plan Yes     F/U: Self-harm action No     F/U: Safety concerns No  No         9/15/2022    11:42 PM 3/2/2023     5:06 PM 6/26/2024    11:57 AM   LUZ-7 SCORE   Total Score 17 (severe anxiety)  13 (moderate anxiety)   Total Score 17 18 13       Review of Systems  Constitutional, HEENT, cardiovascular, pulmonary, gi and gu systems are negative, except as otherwise noted.      Objective            Vitals:  No vitals were obtained today due to virtual visit.    Physical Exam   GENERAL: alert and no distress  EYES: Eyes grossly normal to inspection.  No discharge or erythema, or obvious scleral/conjunctival abnormalities.  RESP: No audible wheeze, cough, or visible cyanosis.    SKIN: Visible skin clear. No significant rash, abnormal pigmentation or lesions.  NEURO: Cranial nerves grossly intact.  Mentation and speech appropriate for age.  PSYCH: Appropriate affect, tone, and pace of words        Video-Visit Details    Type of service:  Video Visit   Video End Time: 2:30 pm  Originating Location (pt. Location): Home    Distant Location (provider location):  On-site  Platform used for Video Visit: Cris  Signed Electronically by: FRANCO BAILON DO

## 2024-06-29 ENCOUNTER — TELEPHONE (OUTPATIENT)
Dept: SCHEDULING | Facility: CLINIC | Age: 44
End: 2024-06-29
Payer: MEDICAID

## 2024-06-29 NOTE — TELEPHONE ENCOUNTER
FYI - Status Update    Who is Calling: patient    Update: pt asking for all prescriptions sent to [facility] in [place] on [address], they should have been sent after visit on 6/28.    One for anxiety, one for curbing cravings for alcohol. Pt desperately needing these. Pt unsure of names of both meds. I told her I only see one order for venlafaxine.        Does caller want a call/response back: Yes     Could we send this information to you in [facility] or would you prefer to receive a phone call?:   No preference       [phone] - ok to leave detailed VM.

## 2024-07-01 ENCOUNTER — PATIENT OUTREACH (OUTPATIENT)
Dept: CARE COORDINATION | Facility: CLINIC | Age: 44
End: 2024-07-01
Payer: MEDICAID

## 2024-07-01 NOTE — TELEPHONE ENCOUNTER
I did send venlafaxine for her to start-was she able to get that?    We talked about starting naltrexone but I needed her to do labs first-which I see she has scheduled on 7/8.     Please call the patient to make sure I am not missing anything. Thanks.    FRANCO BAILON, DO

## 2024-07-01 NOTE — PROGRESS NOTES
Clinic Care Coordination Contact  [facility]/Voicemail    Clinical Data: Care Coordinator Outreach    Outreach Documentation Number of Outreach Attempt   7/1/2024  12:25 PM 1       CHW briefly spoke with patient. Patient requested to be called back after 2:30pm.    Plan: Care Coordinator will call back later this afternoon.    AMMON Linn, B.A. [facility] Care Coordination  [facility]:   [facility]  [phone]

## 2024-07-01 NOTE — LETTER
[facility] CARE COORDINATION      July 2, 2024    Courtney Alaniz  [address]  [facility] [place] [address]      Dear Courtney,    I am a clinic care coordinator who works with Physician Natalia Gipson with the [facility]. I wanted to introduce myself and provide you with my contact information for you to be able to call me with any questions or concerns. Below is a description of clinic care coordination and how I can further assist you.       The clinic care coordination team is made up of a registered nurse, , financial resource worker and community health worker who understand the health care system. The goal of clinic care coordination is to help you manage your health and improve access to the health care system. Our team works alongside your provider to assist you in determining your health and social needs. We can help you obtain health care and community resources, providing you with necessary information and education. We can work with you through any barriers and develop a care plan that helps coordinate and strengthen the communication between you and your care team.  Our services are voluntary and are offered without charge to you personally.    Please feel free to contact me with any questions or concerns regarding care coordination and what we can offer.      We are focused on providing you with the highest-quality healthcare experience possible.    Sincerely,     Carla Peter, MARGY, B.A. [facility] Care Coordination  [facility]:   [facility]  [phone]

## 2024-07-01 NOTE — TELEPHONE ENCOUNTER
This came from [place], was there other medications discussed at her appt?  Nerissa Wharton RN on 7/1/2024 at 11:50 AM

## 2024-07-02 NOTE — TELEPHONE ENCOUNTER
Called patient she will check to see if her RX is at the pharmacy and get her labs done on 7/8/24     Lulu Lua CMA

## 2024-07-02 NOTE — PROGRESS NOTES
Clinic Care Coordination Contact  [facility]/Voicemail    Clinical Data: Care Coordinator Outreach    Outreach Documentation Number of Outreach Attempt   7/1/2024  12:25 PM 1   7/2/2024   3:33 PM 1   7/2/2024   3:34 PM 2       Left message on patient's voicemail with call back information and requested return call.    Plan: Care Coordinator will send care coordination introduction letter with care coordinator contact information and explanation of care coordination services via [employer]. Care Coordinator will do no further outreaches at this time.    AMMON Linn, B.A. [facility] Care Coordination  [facility]:   [facility]  [phone]

## 2024-07-08 ENCOUNTER — E-VISIT (OUTPATIENT)
Dept: FAMILY MEDICINE | Facility: CLINIC | Age: 44
End: 2024-07-08
Payer: MEDICAID

## 2024-07-08 DIAGNOSIS — F10.10 ALCOHOL ABUSE: Primary | ICD-10-CM

## 2024-07-08 PROCEDURE — 99421 OL DIG E/M SVC 5-10 MIN: CPT | Performed by: FAMILY MEDICINE

## 2024-07-08 RX ORDER — NALTREXONE HYDROCHLORIDE 50 MG/1
50 TABLET, FILM COATED ORAL DAILY
Qty: 90 TABLET | Refills: 0 | Status: SHIPPED | OUTPATIENT
Start: 2024-07-08 | End: 2024-07-12

## 2024-07-08 ASSESSMENT — ANXIETY QUESTIONNAIRES
GAD7 TOTAL SCORE: 14
8. IF YOU CHECKED OFF ANY PROBLEMS, HOW DIFFICULT HAVE THESE MADE IT FOR YOU TO DO YOUR WORK, TAKE CARE OF THINGS AT HOME, OR GET ALONG WITH OTHER PEOPLE?: VERY DIFFICULT
GAD7 TOTAL SCORE: 14
7. FEELING AFRAID AS IF SOMETHING AWFUL MIGHT HAPPEN: SEVERAL DAYS
GAD7 TOTAL SCORE: 14

## 2024-07-08 ASSESSMENT — PATIENT HEALTH QUESTIONNAIRE - PHQ9
10. IF YOU CHECKED OFF ANY PROBLEMS, HOW DIFFICULT HAVE THESE PROBLEMS MADE IT FOR YOU TO DO YOUR WORK, TAKE CARE OF THINGS AT HOME, OR GET ALONG WITH OTHER PEOPLE: VERY DIFFICULT
SUM OF ALL RESPONSES TO PHQ QUESTIONS 1-9: 22
SUM OF ALL RESPONSES TO PHQ QUESTIONS 1-9: 22

## 2024-07-08 NOTE — PATIENT INSTRUCTIONS
Thank you for choosing us for your care. I have placed an order for your treatment:   Orders Placed This Encounter   Medications     naltrexone (DEPADE/REVIA) 50 MG tablet     Sig: Take 1 tablet (50 mg) by mouth daily     Dispense:  90 tablet     Refill:  0     This helps to cut back cravings for alcohol. With your history of alcohol withdrawal seizures it is recommended you seek treatment for withdrawal at the ER or at inpatient treatment center.      View your full visit summary for details by clicking on the link below. Your pharmacist will able to address any questions you may have about the medication.      FRANCO BAILON, DO

## 2024-07-08 NOTE — TELEPHONE ENCOUNTER
At our last visit we had discussed due to her hx of alcohol withdrawal seizures she is not a candidate for outpatient withdrawal management. I have sent over naltrexone which helps cut back cravings but does not treat withdrawal. IF she is having withdrawal symptoms she needs to go to ER.     Please call patient and give her crisis resources and the walk in mental health clinic information at [place]. Thank you!    FRANCO BAILON, DO

## 2024-07-09 ENCOUNTER — LAB (OUTPATIENT)
Dept: LAB | Facility: CLINIC | Age: 44
End: 2024-07-09
Payer: MEDICAID

## 2024-07-09 DIAGNOSIS — F10.20 ALCOHOL USE DISORDER, SEVERE, DEPENDENCE (H): ICD-10-CM

## 2024-07-09 PROCEDURE — 36415 COLL VENOUS BLD VENIPUNCTURE: CPT

## 2024-07-09 PROCEDURE — 80053 COMPREHEN METABOLIC PANEL: CPT

## 2024-07-09 ASSESSMENT — PATIENT HEALTH QUESTIONNAIRE - PHQ9: SUM OF ALL RESPONSES TO PHQ QUESTIONS 1-9: 22

## 2024-07-09 NOTE — TELEPHONE ENCOUNTER
Patient informed and voiced understanding. Provided information for crisis resources.    Ty WAYNE RN  [facility]

## 2024-07-10 LAB
ALBUMIN SERPL BCG-MCNC: 4.3 G/DL (ref 3.5–5.2)
ALP SERPL-CCNC: 259 U/L (ref 40–150)
ALT SERPL W P-5'-P-CCNC: 83 U/L (ref 0–50)
ANION GAP SERPL CALCULATED.3IONS-SCNC: 16 MMOL/L (ref 7–15)
AST SERPL W P-5'-P-CCNC: 461 U/L (ref 0–45)
BILIRUB SERPL-MCNC: 1.4 MG/DL
BUN SERPL-MCNC: 7.2 MG/DL (ref 6–20)
CALCIUM SERPL-MCNC: 9.7 MG/DL (ref 8.6–10)
CHLORIDE SERPL-SCNC: 95 MMOL/L (ref 98–107)
CREAT SERPL-MCNC: 0.51 MG/DL (ref 0.51–0.95)
DEPRECATED HCO3 PLAS-SCNC: 28 MMOL/L (ref 22–29)
EGFRCR SERPLBLD CKD-EPI 2021: >90 ML/MIN/1.73M2
GLUCOSE SERPL-MCNC: 112 MG/DL (ref 70–99)
POTASSIUM SERPL-SCNC: 4 MMOL/L (ref 3.4–5.3)
PROT SERPL-MCNC: 7.9 G/DL (ref 6.4–8.3)
SODIUM SERPL-SCNC: 139 MMOL/L (ref 135–145)

## 2024-07-11 ENCOUNTER — TELEPHONE (OUTPATIENT)
Dept: FAMILY MEDICINE | Facility: CLINIC | Age: 44
End: 2024-07-11
Payer: MEDICAID

## 2024-07-11 DIAGNOSIS — R79.89 ELEVATED LFTS: ICD-10-CM

## 2024-07-11 DIAGNOSIS — F10.10 ALCOHOL ABUSE: Primary | ICD-10-CM

## 2024-07-11 NOTE — TELEPHONE ENCOUNTER
Patient called wanting lab results from her evisit with Kierra Weinberg DO on 7/9/24.    Last Comprehensive Metabolic Panel:  Sodium   Date Value Ref Range Status   07/09/2024 139 135 - 145 mmol/L Final     Potassium   Date Value Ref Range Status   07/09/2024 4.0 3.4 - 5.3 mmol/L Final     Chloride   Date Value Ref Range Status   07/09/2024 95 (L) 98 - 107 mmol/L Final     Carbon Dioxide (CO2)   Date Value Ref Range Status   07/09/2024 28 22 - 29 mmol/L Final     Anion Gap   Date Value Ref Range Status   07/09/2024 16 (H) 7 - 15 mmol/L Final     Glucose   Date Value Ref Range Status   07/09/2024 112 (H) 70 - 99 mg/dL Final     Urea Nitrogen   Date Value Ref Range Status   07/09/2024 7.2 6.0 - 20.0 mg/dL Final     Creatinine   Date Value Ref Range Status   07/09/2024 0.51 0.51 - 0.95 mg/dL Final   09/27/2019 0.47 (L) 0.52 - 1.04 mg/dL Final     GFR Estimate   Date Value Ref Range Status   07/09/2024 >90 >60 mL/min/1.73m2 Final     Comment:     eGFR calculated using 2021 CKD-EPI equation.   09/27/2019 >90 >60 mL/min/[1.73_m2] Final     Comment:     Non  GFR Calc  Starting 12/18/2018, serum creatinine based estimated GFR (eGFR) will be   calculated using the Chronic Kidney Disease Epidemiology Collaboration   (CKD-EPI) equation.       Calcium   Date Value Ref Range Status   07/09/2024 9.7 8.6 - 10.0 mg/dL Final     Bilirubin Total   Date Value Ref Range Status   07/09/2024 1.4 (H) <=1.2 mg/dL Final     Alkaline Phosphatase   Date Value Ref Range Status   07/09/2024 259 (H) 40 - 150 U/L Final     ALT   Date Value Ref Range Status   07/09/2024 83 (H) 0 - 50 U/L Final     Comment:     Reference intervals for this test were updated on 6/12/2023 to more accurately reflect our healthy population. There may be differences in the flagging of prior results with similar values performed with this method. Interpretation of those prior results can be made in the context of the updated reference intervals.      09/27/2019 11 0 - 50 U/L Final     AST   Date Value Ref Range Status   07/09/2024 461 (H) 0 - 45 U/L Final     Comment:     Reference intervals for this test were updated on 6/12/2023 to more accurately reflect our healthy population. There may be differences in the flagging of prior results with similar values performed with this method. Interpretation of those prior results can be made in the context of the updated reference intervals.   09/27/2019 15 0 - 45 U/L Final     Message routed to Kierra Weinberg DO to review and advise.    Julie Behrendt RN

## 2024-07-12 ENCOUNTER — MYC MEDICAL ADVICE (OUTPATIENT)
Dept: FAMILY MEDICINE | Facility: CLINIC | Age: 44
End: 2024-07-12
Payer: MEDICAID

## 2024-07-12 DIAGNOSIS — F42.9 OBSESSIVE-COMPULSIVE DISORDER, UNSPECIFIED TYPE: ICD-10-CM

## 2024-07-12 DIAGNOSIS — F40.10 SOCIAL PHOBIA: Primary | ICD-10-CM

## 2024-07-12 DIAGNOSIS — F10.10 ALCOHOL ABUSE: ICD-10-CM

## 2024-07-12 NOTE — TELEPHONE ENCOUNTER
I tried calling patient, but was unable to reach her-are we able to call her with message?    She has significantly elevated LFTS and she cannot take naltrexone with these high of levels as it can worsen this. Please have her stop this if she has picked it up already.     Her liver function is really elevated-likely from her alcohol use. I would like her to see a liver specialist-I have placed referral for this and they will call to get her scheduled.     She needs to follow up with addiction medicine to talk about treatment for alcohol use. She cannot do this without being monitored due to her history of alcohol withdrawal seizures.     Please let me know if she has questions.    FRANCO BAILON, DO

## 2024-07-12 NOTE — TELEPHONE ENCOUNTER
See note from Dr. Kierra Weinberg on 7/12/24 at 2:34 PM. Telephone encounter closed.     Julie Behrendt RN

## 2024-07-12 NOTE — TELEPHONE ENCOUNTER
Please see 7/11/24 Telephone Results encounter.   RN attempted to call patient in that encounter.  Provider also wrote instructions in 7/9/24 Comprehensive Metabolic Lab results note.     Gudelia Todd RN on 7/12/2024 at 12:29 PM

## 2024-07-12 NOTE — TELEPHONE ENCOUNTER
Patient Contact     Attempt # 1     Was call answered?  No.  Left message on voicemail with information to call back.     RN also sent a [employer] message for patient to please call clinic in MyChart encounter from 7/12/24.  Provider also wrote instructions in 7/9/24 Comprehensive Metabolic Lab results note and appears not to have been viewed by the patient at this time.    Gudelia Todd RN on 7/12/2024 at 12:29 PM

## 2024-07-12 NOTE — TELEPHONE ENCOUNTER
"Pt called back and has received this message, she is tearful and wants to know if she is\" dying\" are you able to call her back?  Nerissa Wharton RN on 7/12/2024 at 1:44 PM   "

## 2024-07-12 NOTE — TELEPHONE ENCOUNTER
Question on lab results, very concerned regarding AST    See my chart message      AST   Date Value Ref Range Status   07/09/2024 461 (H) 0 - 45 U/L Final     Comment:     Reference intervals for this test were updated on 6/12/2023 to more accurately reflect our healthy population. There may be differences in the flagging of prior results with similar values performed with this method. Interpretation of those prior results can be made in the context of the updated reference intervals.   09/27/2019 15 0 - 45 U/L Final     Courtney Forrest RN on 7/12/2024 at 10:36 AM

## 2024-07-12 NOTE — TELEPHONE ENCOUNTER
Called and spoke with patient-needs to be seen by addicition medicine to discuss treatment options for alcohol abuse and severe social anxiety. She has lost health insurance and is unable to see her regular therapist because of this.  Does have  through the [facility] and [facility] however they have not been reaching out to her.    Will send message for addiction medicine to reach out to patient.  She can also call to schedule as well.  If symptoms are worsening she needs to call 911 and go to the emergency room I discussed this with patient and she was agreeable.    Placed referral again to addiction med.     FRANCO BAILON, DO

## 2024-07-18 ENCOUNTER — VIRTUAL VISIT (OUTPATIENT)
Dept: FAMILY MEDICINE | Facility: CLINIC | Age: 44
End: 2024-07-18
Payer: MEDICAID

## 2024-07-18 DIAGNOSIS — F15.11 HISTORY OF METHAMPHETAMINE ABUSE (H): ICD-10-CM

## 2024-07-18 DIAGNOSIS — N28.89 LEFT RENAL MASS: ICD-10-CM

## 2024-07-18 DIAGNOSIS — R56.9 SEIZURE (H): ICD-10-CM

## 2024-07-18 DIAGNOSIS — F33.1 MODERATE EPISODE OF RECURRENT MAJOR DEPRESSIVE DISORDER (H): ICD-10-CM

## 2024-07-18 DIAGNOSIS — R79.89 ELEVATED LFTS: Primary | ICD-10-CM

## 2024-07-18 DIAGNOSIS — F42.9 OBSESSIVE-COMPULSIVE DISORDER, UNSPECIFIED TYPE: ICD-10-CM

## 2024-07-18 DIAGNOSIS — F40.10 SOCIAL PHOBIA: ICD-10-CM

## 2024-07-18 DIAGNOSIS — F10.20 ALCOHOL USE DISORDER, SEVERE, DEPENDENCE (H): ICD-10-CM

## 2024-07-18 PROBLEM — F15.20 METHAMPHETAMINE USE DISORDER, MODERATE (H): Status: RESOLVED | Noted: 2023-10-28 | Resolved: 2024-07-18

## 2024-07-18 PROCEDURE — 99214 OFFICE O/P EST MOD 30 MIN: CPT | Mod: 95 | Performed by: FAMILY MEDICINE

## 2024-07-18 NOTE — PROGRESS NOTES
Courtney is a 44 year old who is being evaluated via a billable video visit.    How would you like to obtain your AVS? MyChart  If the video visit is dropped, the invitation should be resent by: Text to cell phone:   Will anyone else be joining your video visit? No      Assessment & Plan     Elevated LFTs  Ongoing alcohol abuse, recommend obtaining US  - US Abdomen Complete    Alcohol use disorder, severe, dependence (H)  STOP naltrexone until she sees hepatology   - US Abdomen Complete    Obsessive-compulsive disorder, unspecified type  Needing to meet with psychiatry unfortunately no insurance currently, she has appt in August scheduled    Social phobia      History of methamphetamine abuse (H)    Moderate episode of recurrent major depressive disorder (H)  Unable to afford venlafaxine-encouraged good rx and [facility] of [facility]     Seizure (H)  Hx of alcohol withdrawal seizures         Subjective   Courtney is a 44 year old, presenting for the following health issues:  Results and Alcohol Problem        7/18/2024     2:52 PM   Additional Questions   Roomed by Brea HENSLEY MA   Accompanied by Self     Video Start Time: 3:15 PM    History of Present Illness       Reason for visit:  Test results and alcohol use i think    She eats 2-3 servings of fruits and vegetables daily.She consumes 0 sweetened beverage(s) daily.She exercises with enough effort to increase her heart rate 9 or less minutes per day.  She exercises with enough effort to increase her heart rate 3 or less days per week.   She is taking medications regularly.     Started on venlafaxine -=just started doing ok thus far    Review of Systems  Constitutional, HEENT, cardiovascular, pulmonary, gi and gu systems are negative, except as otherwise noted.      Objective           Vitals:  No vitals were obtained today due to virtual visit.    Physical Exam   GENERAL: alert and no distress  EYES: Eyes grossly normal to inspection.  No discharge or erythema, or obvious  scleral/conjunctival abnormalities.  RESP: No audible wheeze, cough, or visible cyanosis.    SKIN: Visible skin clear. No significant rash, abnormal pigmentation or lesions.  NEURO: Cranial nerves grossly intact.  Mentation and speech appropriate for age.  PSYCH: Appropriate affect, tone, and pace of words        Video-Visit Details    Type of service:  Video Visit   Video End Time: 3: 25  Originating Location (pt. Location): Home  Distant Location (provider location):  On-site  Platform used for Video Visit: Cris  Signed Electronically by: FRANCO BAILON DO

## 2024-07-19 NOTE — CONFIDENTIAL NOTE
DIAGNOSIS:    Alcohol abuse   Elevated LFTs      Appt Date:  09.20.2024    NOTES STATUS DETAILS   OFFICE NOTE from referring provider Internal 07.11.2024 Kierra Weinberg, DO    MEDICATION LIST Internal    LABS     HEPATIC PANEL (LIVER PANEL) Care Everywhere 10.30.2023   BASIC METABOLIC PANEL Care Everywhere 04.10.2022   COMPLETE METABOLIC PANEL Care Everywhere 10.29.2023   COMPLETE BLOOD COUNT (CBC) Care Everywhere 10.29.2023

## 2024-07-31 RX ORDER — VENLAFAXINE HYDROCHLORIDE 37.5 MG/1
37.5 CAPSULE, EXTENDED RELEASE ORAL DAILY
Qty: 30 CAPSULE | Refills: 1 | Status: SHIPPED | OUTPATIENT
Start: 2024-07-31 | End: 2024-09-18

## 2024-08-12 ENCOUNTER — ANCILLARY PROCEDURE (OUTPATIENT)
Dept: ULTRASOUND IMAGING | Facility: CLINIC | Age: 44
End: 2024-08-12
Attending: FAMILY MEDICINE
Payer: MEDICAID

## 2024-08-12 DIAGNOSIS — F10.20 ALCOHOL USE DISORDER, SEVERE, DEPENDENCE (H): ICD-10-CM

## 2024-08-12 DIAGNOSIS — R79.89 ELEVATED LFTS: ICD-10-CM

## 2024-08-12 PROCEDURE — 76700 US EXAM ABDOM COMPLETE: CPT | Mod: TC | Performed by: RADIOLOGY

## 2024-08-26 ENCOUNTER — ANCILLARY PROCEDURE (OUTPATIENT)
Dept: CT IMAGING | Facility: CLINIC | Age: 44
End: 2024-08-26
Attending: FAMILY MEDICINE
Payer: MEDICAID

## 2024-08-26 DIAGNOSIS — N28.89 LEFT RENAL MASS: ICD-10-CM

## 2024-08-26 PROCEDURE — 74178 CT ABD&PLV WO CNTR FLWD CNTR: CPT | Mod: TC | Performed by: RADIOLOGY

## 2024-08-26 RX ORDER — IOPAMIDOL 755 MG/ML
66 INJECTION, SOLUTION INTRAVASCULAR ONCE
Status: COMPLETED | OUTPATIENT
Start: 2024-08-26 | End: 2024-08-26

## 2024-08-26 RX ADMIN — IOPAMIDOL 66 ML: 755 INJECTION, SOLUTION INTRAVASCULAR at 11:53

## 2024-08-27 ENCOUNTER — MYC MEDICAL ADVICE (OUTPATIENT)
Dept: FAMILY MEDICINE | Facility: CLINIC | Age: 44
End: 2024-08-27
Payer: MEDICAID

## 2024-08-30 NOTE — TELEPHONE ENCOUNTER
If patient has additional questions please have her set up visit-ok for virtual.    FRANCO BAILON, DO

## 2024-09-12 ENCOUNTER — TELEPHONE (OUTPATIENT)
Dept: GASTROENTEROLOGY | Facility: CLINIC | Age: 44
End: 2024-09-12
Payer: COMMERCIAL

## 2024-09-12 NOTE — TELEPHONE ENCOUNTER
Was the patient contacted by phone and reminded of the upcoming visit? message left reminding of video visit.     Sarah Shields [facility]  9/12/2024 2:37 PM

## 2024-09-18 ENCOUNTER — E-VISIT (OUTPATIENT)
Dept: FAMILY MEDICINE | Facility: CLINIC | Age: 44
End: 2024-09-18
Payer: COMMERCIAL

## 2024-09-18 DIAGNOSIS — F42.9 OBSESSIVE-COMPULSIVE DISORDER, UNSPECIFIED TYPE: ICD-10-CM

## 2024-09-18 DIAGNOSIS — F41.9 ANXIETY: Primary | ICD-10-CM

## 2024-09-18 DIAGNOSIS — F40.10 SOCIAL PHOBIA: ICD-10-CM

## 2024-09-18 PROCEDURE — 99421 OL DIG E/M SVC 5-10 MIN: CPT | Performed by: FAMILY MEDICINE

## 2024-09-18 RX ORDER — VENLAFAXINE HYDROCHLORIDE 75 MG/1
75 CAPSULE, EXTENDED RELEASE ORAL DAILY
Qty: 30 CAPSULE | Refills: 1 | Status: SHIPPED | OUTPATIENT
Start: 2024-09-18

## 2024-09-18 RX ORDER — HYDROXYZINE HYDROCHLORIDE 25 MG/1
25 TABLET, FILM COATED ORAL 3 TIMES DAILY PRN
Qty: 90 TABLET | Refills: 0 | Status: SHIPPED | OUTPATIENT
Start: 2024-09-18

## 2024-09-18 ASSESSMENT — PATIENT HEALTH QUESTIONNAIRE - PHQ9
SUM OF ALL RESPONSES TO PHQ QUESTIONS 1-9: 17
10. IF YOU CHECKED OFF ANY PROBLEMS, HOW DIFFICULT HAVE THESE PROBLEMS MADE IT FOR YOU TO DO YOUR WORK, TAKE CARE OF THINGS AT HOME, OR GET ALONG WITH OTHER PEOPLE: VERY DIFFICULT
SUM OF ALL RESPONSES TO PHQ QUESTIONS 1-9: 17

## 2024-09-18 ASSESSMENT — ANXIETY QUESTIONNAIRES
GAD7 TOTAL SCORE: 13
GAD7 TOTAL SCORE: 13
8. IF YOU CHECKED OFF ANY PROBLEMS, HOW DIFFICULT HAVE THESE MADE IT FOR YOU TO DO YOUR WORK, TAKE CARE OF THINGS AT HOME, OR GET ALONG WITH OTHER PEOPLE?: VERY DIFFICULT
GAD7 TOTAL SCORE: 13
7. FEELING AFRAID AS IF SOMETHING AWFUL MIGHT HAPPEN: MORE THAN HALF THE DAYS

## 2024-09-18 NOTE — PATIENT INSTRUCTIONS
Thank you for choosing us for your care. I have placed an order for your treatment:   Orders Placed This Encounter   Medications     venlafaxine (EFFEXOR XR) 75 MG 24 hr capsule     Sig: Take 1 capsule (75 mg) by mouth daily.     Dispense:  30 capsule     Refill:  1     hydrOXYzine HCl (ATARAX) 25 MG tablet     Sig: Take 1 tablet (25 mg) by mouth 3 times daily as needed for anxiety.     Dispense:  90 tablet     Refill:  0    View your full visit summary for details by clicking on the link below. Your pharmacist will able to address any questions you may have about the medication.      If you're not feeling better within 4-6 weeks please schedule an appointment.  You can schedule an appointment right here in [place], or call [phone]  If the visit is for the same symptoms as your eVisit, we'll refund the cost of your eVisit if seen within seven days.    Learning About Anxiety Disorders  What are anxiety disorders?     Anxiety disorders are a type of medical problem. They cause severe anxiety. When you feel anxious, you feel that something bad is about to happen. This feeling interferes with your life.  These disorders include:  Generalized anxiety disorder. You feel worried and stressed about many everyday events and activities. This goes on for several months and disrupts your life on most days.  Panic disorder. You have repeated panic attacks. A panic attack is a sudden, intense fear or anxiety. It may make you feel short of breath. Your heart may pound.  Social anxiety disorder. You feel very anxious about what you will say or do in front of people. For example, you may be scared to talk or eat in public. This problem affects your daily life.  Phobias. You are very scared of a specific object, situation, or activity. For example, you may fear spiders, high places, or small spaces.  What are the symptoms?  Generalized anxiety disorder  Symptoms may include:  Feeling worried and stressed about many things almost  every day.  Feeling tired or irritable. You may have a hard time concentrating.  Having headaches or muscle aches.  Having a hard time getting to sleep or staying asleep.  Panic disorder  You may have repeated panic attacks when there is no reason for feeling afraid. You may change your daily activities because you worry that you will have another attack.  Symptoms may include:  Intense fear, terror, or anxiety.  Trouble breathing or very fast breathing.  Chest pain or tightness.  A heartbeat that races or is not regular.  Social anxiety disorder  Symptoms may include:  Fear about a social situation, such as eating in front of others or speaking in public. You may worry a lot. Or you may be afraid that something bad will happen.  Anxiety that can cause you to blush, sweat, and feel shaky.  A heartbeat that is faster than normal.  A hard time focusing.  Phobias  Symptoms may include:  More fear than most people of being around an object, being in a situation, or doing an activity. You might also be stressed about the chance of being around the thing you fear.  Worry about losing control, panicking, fainting, or having physical symptoms like a faster heartbeat when you are around the situation or object.  How are these disorders treated?  Anxiety disorders can be treated with medicines or counseling. A combination of both may be used.  Medicines may include:  Antidepressants. These may help your symptoms by keeping chemicals in your brain in balance.  Benzodiazepines. These may give you short-term relief of your symptoms.  Some people use cognitive-behavioral therapy. A therapist helps you learn to change stressful or bad thoughts into helpful thoughts.  Lead a healthy lifestyle  A healthy lifestyle may help you feel better.  Get at least 30 minutes of exercise on most days of the week. Walking is a good choice.  Eat a healthy diet. Include fruits, vegetables, lean proteins, and whole grains in your diet each  "day.  Try to go to bed at the same time every night. Try for 8 hours of sleep a night.  Find ways to manage stress. Try relaxation exercises.  Avoid alcohol and illegal drugs.  Follow-up care is a key part of your treatment and safety. Be sure to make and go to all appointments, and call your doctor if you are having problems. It's also a good idea to know your test results and keep a list of the medicines you take.  Where can you learn more?  Go to https://www.[employer].net/patiented  Enter K667 in the search box to learn more about \"Learning About Anxiety Disorders.\"  Current as of: June 24, 2023  Content Version: 14.1 2006-2024 [employer].   Care instructions adapted under license by your healthcare professional. If you have questions about a medical condition or this instruction, always ask your healthcare professional. [employer] disclaims any warranty or liability for your use of this information.    "

## 2024-09-19 ASSESSMENT — PATIENT HEALTH QUESTIONNAIRE - PHQ9: SUM OF ALL RESPONSES TO PHQ QUESTIONS 1-9: 17

## 2024-09-20 ENCOUNTER — VIRTUAL VISIT (OUTPATIENT)
Dept: GASTROENTEROLOGY | Facility: CLINIC | Age: 44
End: 2024-09-20
Attending: FAMILY MEDICINE
Payer: COMMERCIAL

## 2024-09-20 ENCOUNTER — PRE VISIT (OUTPATIENT)
Dept: GASTROENTEROLOGY | Facility: CLINIC | Age: 44
End: 2024-09-20
Payer: COMMERCIAL

## 2024-09-20 DIAGNOSIS — F10.10 ALCOHOL ABUSE: ICD-10-CM

## 2024-09-20 DIAGNOSIS — R79.89 ELEVATED LFTS: ICD-10-CM

## 2024-09-20 PROCEDURE — 99204 OFFICE O/P NEW MOD 45 MIN: CPT | Mod: 95 | Performed by: STUDENT IN AN ORGANIZED HEALTH CARE EDUCATION/TRAINING PROGRAM

## 2024-09-20 NOTE — NURSING NOTE
Current patient location: [address] [place] [address] [address]  [place] [address]    Is the patient currently in the state of [place]? YES    Visit mode:VIDEO    If the visit is dropped, the patient can be reconnected by: VIDEO VISIT: Send to e-mail at: [email or web address]    Will anyone else be joining the visit? NO  (If patient encounters technical issues they should call [phone]    How would you like to obtain your AVS? MyChart    Are changes needed to the allergy or medication list? No    Are refills needed on medications prescribed by this physician? NO    Rooming Documentation:  Questionnaire(s) completed      Reason for visit: Consult    Rick TURNER

## 2024-09-20 NOTE — LETTER
9/20/2024      Courtney Alaniz  [address]  [facility] [address]      Dear Colleague,    Thank you for referring your patient, Courtney Alaniz, to the [facility] [place]. Please see a copy of my visit note below.    Virtual Visit Details    Type of service:  Video Visit   Video Start Time: 9:55 AM  Video End Time: 10:15 AM    Originating Location (pt. Location): Home  Distant Location (provider location):  Off-site  Platform used for Video Visit: [facility] New Patient Visit    Date of Visit: September 20, 2024    Reason for referral: elevated LFTs, ARLD    Subjective: Ms. Alaniz is a 44 year old woman with a history of alcohol use disorder and alcohol related liver disease who presents to [facility].    She had an ultrasound in August that showed a coarsened liver with a normal-sized spleen and no ascites.  She had a CT scan in August that showed severe hepatic steatosis without signs of portal hypertension    She had normal liver enzymes in 2019 but had labs earlier this summer that were consistent with mild alcohol-related hepatitis.  She had admission to the hospital fall 2023 for a seizure thought to be related to alcohol withdrawal.She had labs earlier this summer that were consistent with mild alcohol-related hepatitis.  She had admission to the hospital fall 2023 for a seizure thought to be related to alcohol withdrawal.    She saw her primary care doctor earlier in August and they talked about her stopping drinking.  She will drink anywhere between 2-3 shots a day to up to 15 shots a day.  She has some days where she does not drink for a day but will have bed shakes during this.  She has significant social anxiety and does not think she would do well with group treatment.    She reports that when she was pregnant with her daughter 6 years ago she stopped drinking and did not resume drinking until 6-months after her son  was warm.  She did not do treatment at that time.  She had DUI recently and because of this her children were removed from her home by child protective services.  She is very upset about this and that is contributing to her drinking more.  She wants to be sober.    ROS: 14 point ROS negative except for positives noted in HPI.    PMHx:  Past Medical History:   Diagnosis Date     Chronic back pain      History of anemia      History of substance abuse (H)    Alcohol use disorder    PSHx:  Past Surgical History:   Procedure Laterality Date     BACK SURGERY      Age 15 for bone spur      BIOPSY CERVICAL, LOCAL EXCISION, SINGLE/MULTIPLE        SECTION      2018 for abruption       SECTION N/A 10/4/2019    Procedure: Repeat  section;  Surgeon: Tanika Cifuentes MD;  Location: [facility]      SECTION N/A 2018    Procedure:  SECTION;  Surgeon: Becky Lal MD;  Location: [facility];  Service:      SPINE SURGERY      Pt reports surgery at [facility] in mid  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     Anxiety Disorder Mother      Bipolar Disorder Mother      Substance Abuse Mother      Unknown/Adopted Mother      Other - See Comments Father         health history unknown     Unknown/Adopted Maternal Grandmother      Other - See Comments Maternal Grandmother         health history unknown     Unknown/Adopted Maternal Grandfather      Other - See Comments Maternal Grandfather         health history unknown     Substance Abuse Sister      Depression Brother      Schizophrenia Brother      Other - See Comments Paternal Grandmother         health history unknown     Other - See Comments Paternal Grandfather         health history unknown     Substance Abuse Son      No family history of liver disease,  liver cancer    SocHx:  Social History     Socioeconomic History     Marital status: Single     Spouse name: Not on file     Number of children: Not on file     Years of education: Not on file     Highest education level: Not on file   Occupational History     Not on file   Tobacco Use     Smoking status: Never     Passive exposure: Never     Smokeless tobacco: Never   Vaping Use     Vaping status: Never Used   Substance and Sexual Activity     Alcohol use: Yes     Comment: quit with pregnancy     Drug use: No     Sexual activity: Yes     Partners: Male   Other Topics Concern     Parent/sibling w/ CABG, MI or angioplasty before 65F 55M? Not Asked   Social History Narrative     Not on file     Social Determinants of Health     Financial Resource Strain: Low Risk  (10/28/2023)    Received from [facility], [facility]    Financial Resource Strain      Difficulty of Paying Living Expenses: 3      Difficulty of Paying Living Expenses: Not on file   Food Insecurity: Food Insecurity Present (10/28/2023)    Received from [facility] [employer] [facility], [facility]    Food Insecurity      Worried About Running Out of Food in the Last Year: 2   Transportation Needs: Unmet Transportation Needs (10/28/2023)    Received from [facility] [employer] [facility], [facility]    Transportation Needs      Lack of Transportation (Medical): 2   Physical Activity: Not on file   Stress: Not on file   Social Connections: Socially Isolated (10/28/2023)    Received from [facility]  [employer] [facility], [facility]    Social Connections      Frequency of Communication with Friends and Family: 4   Interpersonal Safety: Not on file   Housing Stability: Low Risk  (10/28/2023)    Received from [facility] [employer] [facility], [facility]  [facility]    Housing Stability      Unable to Pay for Housing in the Last Year: 1       Medications:  Current Outpatient Medications   Medication Sig Dispense Refill     albuterol (PROVENTIL HFA) 108 (90 Base) MCG/ACT inhaler Inhale 2 puffs into the lungs every 6 hours 18 g 0     azelastine (ASTELIN) 0.1 % nasal spray Spray 1 spray into both nostrils 2 times daily 30 mL 0     hydrOXYzine HCl (ATARAX) 25 MG tablet Take 1 tablet (25 mg) by mouth 3 times daily as needed for anxiety. 90 tablet 0     venlafaxine (EFFEXOR XR) 75 MG 24 hr capsule Take 1 capsule (75 mg) by mouth daily. 30 capsule 1     No current facility-administered medications for this visit.     No OTCs, herbals    Allergies:  Allergies   Allergen Reactions     Nkda [No Known Drug Allergy]        Objective:  There were no vitals taken for this visit.  Constitutional: pleasant woman in NAD  Eyes: non icteric  Respiratory: Normal respiratory excursion   MSK: normal range of motion of visualized extremities  Abd: Non distended  Skin: No jaundice  Psychiatric: normal mood and orientation    Labs:  Last Comprehensive Metabolic Panel:  Sodium   Date Value Ref Range Status   07/09/2024 139 135 - 145 mmol/L Final     Potassium   Date Value Ref Range Status   07/09/2024 4.0 3.4 - 5.3 mmol/L Final     Chloride   Date Value Ref Range Status   07/09/2024 95 (L) 98 - 107 mmol/L Final     Carbon Dioxide (CO2)   Date Value Ref Range Status   07/09/2024 28 22 - 29 mmol/L Final     Anion Gap   Date Value Ref Range Status   07/09/2024 16 (H) 7 - 15 mmol/L Final     Glucose   Date Value Ref Range Status   07/09/2024 112 (H) 70 - 99 mg/dL Final     Urea Nitrogen   Date Value Ref Range Status   07/09/2024 7.2 6.0 - 20.0 mg/dL Final     Creatinine   Date Value Ref Range Status   07/09/2024 0.51 0.51 - 0.95 mg/dL Final   09/27/2019 0.47 (L) 0.52 - 1.04 mg/dL Final     GFR Estimate   Date Value Ref Range Status   07/09/2024 >90 >60 mL/min/1.73m2  Final     Comment:     eGFR calculated using 2021 CKD-EPI equation.   09/27/2019 >90 >60 mL/min/[1.73_m2] Final     Comment:     Non  GFR Calc  Starting 12/18/2018, serum creatinine based estimated GFR (eGFR) will be   calculated using the Chronic Kidney Disease Epidemiology Collaboration   (CKD-EPI) equation.       Calcium   Date Value Ref Range Status   07/09/2024 9.7 8.6 - 10.0 mg/dL Final     Bilirubin Total   Date Value Ref Range Status   07/09/2024 1.4 (H) <=1.2 mg/dL Final     Alkaline Phosphatase   Date Value Ref Range Status   07/09/2024 259 (H) 40 - 150 U/L Final     ALT   Date Value Ref Range Status   07/09/2024 83 (H) 0 - 50 U/L Final     Comment:     Reference intervals for this test were updated on 6/12/2023 to more accurately reflect our healthy population. There may be differences in the flagging of prior results with similar values performed with this method. Interpretation of those prior results can be made in the context of the updated reference intervals.     09/27/2019 11 0 - 50 U/L Final     AST   Date Value Ref Range Status   07/09/2024 461 (H) 0 - 45 U/L Final     Comment:     Reference intervals for this test were updated on 6/12/2023 to more accurately reflect our healthy population. There may be differences in the flagging of prior results with similar values performed with this method. Interpretation of those prior results can be made in the context of the updated reference intervals.   09/27/2019 15 0 - 45 U/L Final       Lab Results   Component Value Date    WBC 13.9 10/04/2019     Lab Results   Component Value Date    RBC 3.51 10/04/2019     Lab Results   Component Value Date    HGB 7.7 10/05/2019     Lab Results   Component Value Date    HCT 27.3 10/04/2019     Lab Results   Component Value Date    MCV 78 10/04/2019     Lab Results   Component Value Date    MCH 22.5 10/04/2019     Lab Results   Component Value Date    MCHC 28.9 10/04/2019     Lab Results   Component  "Value Date    RDW 17.0 10/04/2019     Lab Results   Component Value Date     10/04/2019         Previous work-up:   Lab Results   Component Value Date    HEPBANG Nonreactive 09/27/2019    AUSAB Negative 09/08/2017    HCVAB Negative 07/01/2016    CHOL 232 (H) 10/07/2016    HDL 73 10/07/2016     (H) 10/07/2016    TRIG 83 10/07/2016    A1C 5.0 09/27/2019      No results found for: \"SPECDES\", \"LDRESULTS\"    Imaging: Reviewed in EHR    Independently reviewed labs and imaging.     Assessment/Plan: Ms. Alaniz is a 44 year old woman with a history of alcohol use disorder and alcohol-related liver disease who presents to establish care for her liver disease.  She has evidence of moderate alcohol-related liver disease with moderate alcohol related hepatitis at her last labs.  She has hepatic steatosis.  She does not have signs of portal hypertension.  She has had complicated withdrawal in the past including a seizure at home when she tried to stop drinking.  Based on this I do not think it is safe for her to get outpatient taper to stop drinking alcohol.  I would recommend she present to the emergency room and be admitted for treatment of alcohol withdrawal.  After this I would recommend that she engage in alcohol use disorder treatment.  She is nervous about doing inpatient treatment due to social anxiety.  Discussed that intensive outpatient treatment would be an option but that still has a group component.  I think that any medication for alcohol craving would be beneficial after she goes through detox, this would include gabapentin, naltrexone or acamprosate.  Discussed that if she continues to drink her liver will get sicker and she is at risk for death from alcohol related liver disease.  Discussed that if she had worsening liver disease due to the ongoing alcohol use she would not be a liver transplant candidate because of this.  She will talk to her and her  about bringing her to an emergency " room to start detox.    Orders Placed This Encounter   Procedures     Hepatic function panel     Basic metabolic panel     CBC with platelets     INR       RTC 3 months with labs       Sharon Echeverria MD MS  Hepatology/Liver Transplant  [facility]        Again, thank you for allowing me to participate in the care of your patient.        Sincerely,        Sharon Echeverria MD

## 2024-09-20 NOTE — PROGRESS NOTES
Virtual Visit Details    Type of service:  Video Visit   Video Start Time: 9:55 AM  Video End Time: 10:15 AM    Originating Location (pt. Location): Home  Distant Location (provider location):  Off-site  Platform used for Video Visit: [facility] New Patient Visit    Date of Visit: September 20, 2024    Reason for referral: elevated LFTs, ARLD    Subjective: Ms. Alaniz is a 44 year old woman with a history of alcohol use disorder and alcohol related liver disease who presents to hospitals care.    She had an ultrasound in August that showed a coarsened liver with a normal-sized spleen and no ascites.  She had a CT scan in August that showed severe hepatic steatosis without signs of portal hypertension    She had normal liver enzymes in 2019 but had labs earlier this summer that were consistent with mild alcohol-related hepatitis.  She had admission to the hospital fall 2023 for a seizure thought to be related to alcohol withdrawal.She had labs earlier this summer that were consistent with mild alcohol-related hepatitis.  She had admission to the hospital fall 2023 for a seizure thought to be related to alcohol withdrawal.    She saw her primary care doctor earlier in August and they talked about her stopping drinking.  She will drink anywhere between 2-3 shots a day to up to 15 shots a day.  She has some days where she does not drink for a day but will have bed shakes during this.  She has significant social anxiety and does not think she would do well with group treatment.    She reports that when she was pregnant with her daughter 6 years ago she stopped drinking and did not resume drinking until 6-months after her son was warm.  She did not do treatment at that time.  She had DUI recently and because of this her children were removed from her home by child protective services.  She is very upset about this and that is contributing to her drinking more.  She wants to be  sober.    ROS: 14 point ROS negative except for positives noted in HPI.    PMHx:  Past Medical History:   Diagnosis Date    Chronic back pain     History of anemia     History of substance abuse (H)    Alcohol use disorder    PSHx:  Past Surgical History:   Procedure Laterality Date    BACK SURGERY      Age 15 for bone spur     BIOPSY CERVICAL, LOCAL EXCISION, SINGLE/MULTIPLE       SECTION      2018 for abruption      SECTION N/A 10/4/2019    Procedure: Repeat  section;  Surgeon: Tanika Cifuentes MD;  Location: [facility]     SECTION N/A 2018    Procedure:  SECTION;  Surgeon: Becky Lal MD;  Location: [facility] OR;  Service:     SPINE SURGERY      Pt reports surgery at [place] in mid  for in 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    Anxiety Disorder Mother     Bipolar Disorder Mother     Substance Abuse Mother     Unknown/Adopted Mother     Other - See Comments Father         health history unknown    Unknown/Adopted Maternal Grandmother     Other - See Comments Maternal Grandmother         health history unknown    Unknown/Adopted Maternal Grandfather     Other - See Comments Maternal Grandfather         health history unknown    Substance Abuse Sister     Depression Brother     Schizophrenia Brother     Other - See Comments Paternal Grandmother         health history unknown    Other - See Comments Paternal Grandfather         health history unknown    Substance Abuse Son      No family history of liver disease, liver cancer    SocHx:  Social History     Socioeconomic History    Marital status: Single     Spouse name: Not on file    Number of children: Not on file    Years of education: Not on file    Highest education level: Not on file   Occupational History    Not on file   Tobacco Use     Smoking status: Never     Passive exposure: Never    Smokeless tobacco: Never   Vaping Use    Vaping status: Never Used   Substance and Sexual Activity    Alcohol use: Yes     Comment: quit with pregnancy    Drug use: No    Sexual activity: Yes     Partners: Male   Other Topics Concern    Parent/sibling w/ CABG, MI or angioplasty before 65F 55M? Not Asked   Social History Narrative    Not on file     Social Determinants of Health     Financial Resource Strain: Low Risk  (10/28/2023)    [facility], [facility]    Financial Resource Strain     Difficulty of Paying Living Expenses: 3     Difficulty of Paying Living Expenses: Not on file   Food Insecurity: Food Insecurity Present (10/28/2023)    [facility], [email or web address]    Food Insecurity     Worried About Running Out of Food in the Last Year: 2   Transportation Needs: Unmet Transportation Needs (10/28/2023)    [facility], [facility]    Transportation Needs     Lack of Transportation (Medical): 2   Physical Activity: Not on file   Stress: Not on file   Social Connections: Socially Isolated (10/28/2023)    [facility], [facility]    Social Connections     Frequency of Communication with Friends and Family: 4   Interpersonal Safety: Not on file   Housing Stability: Low Risk  (10/28/2023)    [facility], [facility]    Housing Stability     Unable to Pay for Housing in the Last Year: 1       Medications:  Current Outpatient Medications   Medication Sig Dispense Refill    albuterol (PROVENTIL HFA) 108 (90 Base) MCG/ACT inhaler Inhale 2 puffs into the lungs every 6 hours 18 g  0    azelastine (ASTELIN) 0.1 % nasal spray Spray 1 spray into both nostrils 2 times daily 30 mL 0    hydrOXYzine HCl (ATARAX) 25 MG tablet Take 1 tablet (25 mg) by mouth 3 times daily as needed for anxiety. 90 tablet 0    venlafaxine (EFFEXOR XR) 75 MG 24 hr capsule Take 1 capsule (75 mg) by mouth daily. 30 capsule 1     No current facility-administered medications for this visit.     No OTCs, herbals    Allergies:  Allergies   Allergen Reactions    Nkda [No Known Drug Allergy]        Objective:  There were no vitals taken for this visit.  Constitutional: pleasant woman in NAD  Eyes: non icteric  Respiratory: Normal respiratory excursion   MSK: normal range of motion of visualized extremities  Abd: Non distended  Skin: No jaundice  Psychiatric: normal mood and orientation    Labs:  Last Comprehensive Metabolic Panel:  Sodium   Date Value Ref Range Status   07/09/2024 139 135 - 145 mmol/L Final     Potassium   Date Value Ref Range Status   07/09/2024 4.0 3.4 - 5.3 mmol/L Final     Chloride   Date Value Ref Range Status   07/09/2024 95 (L) 98 - 107 mmol/L Final     Carbon Dioxide (CO2)   Date Value Ref Range Status   07/09/2024 28 22 - 29 mmol/L Final     Anion Gap   Date Value Ref Range Status   07/09/2024 16 (H) 7 - 15 mmol/L Final     Glucose   Date Value Ref Range Status   07/09/2024 112 (H) 70 - 99 mg/dL Final     Urea Nitrogen   Date Value Ref Range Status   07/09/2024 7.2 6.0 - 20.0 mg/dL Final     Creatinine   Date Value Ref Range Status   07/09/2024 0.51 0.51 - 0.95 mg/dL Final   09/27/2019 0.47 (L) 0.52 - 1.04 mg/dL Final     GFR Estimate   Date Value Ref Range Status   07/09/2024 >90 >60 mL/min/1.73m2 Final     Comment:     eGFR calculated using 2021 CKD-EPI equation.   09/27/2019 >90 >60 mL/min/[1.73_m2] Final     Comment:     Non  GFR Calc  Starting 12/18/2018, serum creatinine based estimated GFR (eGFR) will be   calculated using the Chronic Kidney Disease Epidemiology Collaboration    (CKD-EPI) equation.       Calcium   Date Value Ref Range Status   07/09/2024 9.7 8.6 - 10.0 mg/dL Final     Bilirubin Total   Date Value Ref Range Status   07/09/2024 1.4 (H) <=1.2 mg/dL Final     Alkaline Phosphatase   Date Value Ref Range Status   07/09/2024 259 (H) 40 - 150 U/L Final     ALT   Date Value Ref Range Status   07/09/2024 83 (H) 0 - 50 U/L Final     Comment:     Reference intervals for this test were updated on 6/12/2023 to more accurately reflect our healthy population. There may be differences in the flagging of prior results with similar values performed with this method. Interpretation of those prior results can be made in the context of the updated reference intervals.     09/27/2019 11 0 - 50 U/L Final     AST   Date Value Ref Range Status   07/09/2024 461 (H) 0 - 45 U/L Final     Comment:     Reference intervals for this test were updated on 6/12/2023 to more accurately reflect our healthy population. There may be differences in the flagging of prior results with similar values performed with this method. Interpretation of those prior results can be made in the context of the updated reference intervals.   09/27/2019 15 0 - 45 U/L Final       Lab Results   Component Value Date    WBC 13.9 10/04/2019     Lab Results   Component Value Date    RBC 3.51 10/04/2019     Lab Results   Component Value Date    HGB 7.7 10/05/2019     Lab Results   Component Value Date    HCT 27.3 10/04/2019     Lab Results   Component Value Date    MCV 78 10/04/2019     Lab Results   Component Value Date    MCH 22.5 10/04/2019     Lab Results   Component Value Date    MCHC 28.9 10/04/2019     Lab Results   Component Value Date    RDW 17.0 10/04/2019     Lab Results   Component Value Date     10/04/2019         Previous work-up:   Lab Results   Component Value Date    HEPBANG Nonreactive 09/27/2019    AUSAB Negative 09/08/2017    HCVAB Negative 07/01/2016    CHOL 232 (H) 10/07/2016    HDL 73 10/07/2016      "(H) 10/07/2016    TRIG 83 10/07/2016    A1C 5.0 09/27/2019      No results found for: \"SPECDES\", \"LDRESULTS\"    Imaging: Reviewed in EHR    Independently reviewed labs and imaging.     Assessment/Plan: Ms. Alaniz is a 44 year old woman with a history of alcohol use disorder and alcohol-related liver disease who presents to establish care for her liver disease.  She has evidence of moderate alcohol-related liver disease with moderate alcohol related hepatitis at her last labs.  She has hepatic steatosis.  She does not have signs of portal hypertension.  She has had complicated withdrawal in the past including a seizure at home when she tried to stop drinking.  Based on this I do not think it is safe for her to get outpatient taper to stop drinking alcohol.  I would recommend she present to the emergency room and be admitted for treatment of alcohol withdrawal.  After this I would recommend that she engage in alcohol use disorder treatment.  She is nervous about doing inpatient treatment due to social anxiety.  Discussed that intensive outpatient treatment would be an option but that still has a group component.  I think that any medication for alcohol craving would be beneficial after she goes through detox, this would include gabapentin, naltrexone or acamprosate.  Discussed that if she continues to drink her liver will get sicker and she is at risk for death from alcohol related liver disease.  Discussed that if she had worsening liver disease due to the ongoing alcohol use she would not be a liver transplant candidate because of this.  She will talk to her and her  about bringing her to an emergency room to start detox.    Orders Placed This Encounter   Procedures    Hepatic function panel    Basic metabolic panel    CBC with platelets    INR       RTC 3 months with labs       Sharon Echeverria MD MS  Hepatology/Liver Transplant  [facility]      "

## 2024-10-28 ENCOUNTER — E-VISIT (OUTPATIENT)
Dept: URGENT CARE | Facility: CLINIC | Age: 44
End: 2024-10-28
Payer: COMMERCIAL

## 2024-10-28 DIAGNOSIS — R06.89 DIFFICULTY BREATHING: Primary | ICD-10-CM

## 2024-10-28 PROCEDURE — 99207 PR NON-BILLABLE SERV PER CHARTING: CPT | Performed by: FAMILY MEDICINE

## 2024-10-28 NOTE — PATIENT INSTRUCTIONS
Dear Courtney Alaniz,    We are sorry you are not feeling well. Based on the responses you provided, it is recommended that you be seen in-person in urgent care so we can better evaluate your symptoms. Please click here to find the nearest urgent care location to you.   You will not be charged for this Visit. Thank you for trusting us with your care.    If your symptoms are severe, please proceed to the Emergency Room with any significant shortness of breath or difficulty breathing    Kerrie Guajardo MD

## 2024-11-04 ENCOUNTER — TELEPHONE (OUTPATIENT)
Dept: FAMILY MEDICINE | Facility: CLINIC | Age: 44
End: 2024-11-04

## 2024-11-04 ENCOUNTER — MYC MEDICAL ADVICE (OUTPATIENT)
Dept: FAMILY MEDICINE | Facility: CLINIC | Age: 44
End: 2024-11-04

## 2024-11-04 NOTE — TELEPHONE ENCOUNTER
"Hi all,    Sorry about the other message. It did not seem to make it into the chart so I am sending it the correct way.     Can you, please, contact patient and triage reason for being seen?  Looks like she had an Evisit and was recommended to be seen in person, then made a virtual visit with me. I have never seen patient and, therefore, a \"general concern\" visit is not appropriate. Also, if ill, needs to be an in person visit. Thanks.    Mary"

## 2024-11-04 NOTE — TELEPHONE ENCOUNTER
I left a message to return a call to [phone].  Please triage the reason for her appointment today as it just states general concern.  It may be more appropriate to change to an in person appointment if a higher level of care is not needed.  Debra Ro R.N.      The e-visit the patient had sent to urgent care was in regards to breathing.  I am not sure her appointment today is in regards to these symptoms or not.

## 2024-11-04 NOTE — TELEPHONE ENCOUNTER
Please see telephone encounter dated 11/4/2024 for further documentation of this [employer] message.  Thank you. Debra Ro R.N.

## 2024-11-06 ASSESSMENT — PATIENT HEALTH QUESTIONNAIRE - PHQ9: SUM OF ALL RESPONSES TO PHQ QUESTIONS 1-9: 22

## 2024-11-15 ENCOUNTER — E-VISIT (OUTPATIENT)
Dept: URGENT CARE | Facility: CLINIC | Age: 44
End: 2024-11-15
Payer: COMMERCIAL

## 2024-11-15 DIAGNOSIS — R05.1 ACUTE COUGH: Primary | ICD-10-CM

## 2024-11-15 PROCEDURE — 99207 PR NON-BILLABLE SERV PER CHARTING: CPT | Performed by: FAMILY MEDICINE

## 2024-11-15 NOTE — PATIENT INSTRUCTIONS
Dear Courtney Alaniz,    We are sorry you are not feeling well. Based on the responses you provided, it is recommended that you be seen in-person in urgent care so we can better evaluate your symptoms. Please click here to find the nearest urgent care location to you.   You will not be charged for this Visit. Thank you for trusting us with your care.    Griffin Darling MD

## 2024-11-17 ENCOUNTER — OFFICE VISIT (OUTPATIENT)
Dept: URGENT CARE | Facility: URGENT CARE | Age: 44
End: 2024-11-17
Payer: COMMERCIAL

## 2024-11-17 VITALS
HEART RATE: 120 BPM | DIASTOLIC BLOOD PRESSURE: 75 MMHG | OXYGEN SATURATION: 96 % | WEIGHT: 100 LBS | BODY MASS INDEX: 17.71 KG/M2 | RESPIRATION RATE: 16 BRPM | SYSTOLIC BLOOD PRESSURE: 124 MMHG | TEMPERATURE: 99 F

## 2024-11-17 DIAGNOSIS — J30.81 PET ALLERGY: ICD-10-CM

## 2024-11-17 DIAGNOSIS — R05.1 ACUTE COUGH: Primary | ICD-10-CM

## 2024-11-17 PROCEDURE — 99213 OFFICE O/P EST LOW 20 MIN: CPT

## 2024-11-17 RX ORDER — ALBUTEROL SULFATE 90 UG/1
2 INHALANT RESPIRATORY (INHALATION) EVERY 6 HOURS PRN
Qty: 8.5 G | Refills: 0 | Status: SHIPPED | OUTPATIENT
Start: 2024-11-17

## 2024-11-17 RX ORDER — LORATADINE 10 MG/1
10 TABLET ORAL DAILY
Qty: 21 TABLET | Refills: 0 | Status: SHIPPED | OUTPATIENT
Start: 2024-11-17

## 2024-11-17 NOTE — PROGRESS NOTES
ASSESSMENT:  (R05.1) Acute cough  (primary encounter diagnosis)  Plan: albuterol (PROAIR HFA/PROVENTIL HFA/VENTOLIN         HFA) 108 (90 Base) MCG/ACT inhaler    (J30.81) Pet allergy  Plan: loratadine (CLARITIN) 10 MG tablet    PLAN:  Informed the patient to take Claritin and use the albuterol as prescribed.  We discussed that her symptoms are likely related to the exposure of animals in the house that she is staying in as the symptoms only occur when she is in the house.  We also discussed following up with her primary care provider should symptoms persist.  The patient acknowledged her understanding of the above plan.    The use of Dragon/Tourlandishation services may have been used to construct the content in this note; any grammatical or spelling errors are non-intentional. Please contact the author of this note directly if you are in need of any clarification.      Demian St, AMY CNP      SUBJECTIVE:  Courtney Alaniz is a 44 year old female who presents to the clinic today with a chief complaint of intermittent cough  for 2 day(s).  Her cough is described as productive clear.    The patient's symptoms are improving.  Associated symptoms include rhinorrhea. The patient's symptoms are exacerbated by the house she is staying at where there are animals.  Patient has been using honey to improve symptoms.    ROS  Negative except noted above.     OBJECTIVE:  /75   Pulse 120   Temp 99  F (37.2  C) (Tympanic)   Resp 16   Wt 45.4 kg (100 lb)   SpO2 96%   BMI 17.71 kg/m    GENERAL APPEARANCE: healthy, alert and no distress  EYES: EOMI,  PERRL, conjunctiva clear  HENT: nose and mouth without erythema, ulcers or lesions and oral mucous membranes moist, no erythema noted  NECK: supple, nontender, no lymphadenopathy  RESP: lungs clear to auscultation - no rales, rhonchi or wheezes  CV: regular rates and rhythm, normal S1 S2, no murmur noted  SKIN: no suspicious lesions or rashes

## 2024-11-17 NOTE — PATIENT INSTRUCTIONS
Take the Claritin and use the albuterol as prescribed.  Follow up with your primary care provider should symptoms persist.

## 2025-05-17 ENCOUNTER — OFFICE VISIT (OUTPATIENT)
Dept: URGENT CARE | Facility: URGENT CARE | Age: 45
End: 2025-05-17
Payer: COMMERCIAL

## 2025-05-17 VITALS
WEIGHT: 114 LBS | BODY MASS INDEX: 19.46 KG/M2 | SYSTOLIC BLOOD PRESSURE: 132 MMHG | RESPIRATION RATE: 18 BRPM | TEMPERATURE: 98.1 F | HEIGHT: 64 IN | DIASTOLIC BLOOD PRESSURE: 86 MMHG | OXYGEN SATURATION: 97 % | HEART RATE: 104 BPM

## 2025-05-17 DIAGNOSIS — R31.9 HEMATURIA, UNSPECIFIED TYPE: Primary | ICD-10-CM

## 2025-05-17 LAB
ALBUMIN UR-MCNC: 30 MG/DL
APPEARANCE UR: ABNORMAL
BACTERIA #/AREA URNS HPF: ABNORMAL /HPF
BILIRUB UR QL STRIP: NEGATIVE
COLOR UR AUTO: ABNORMAL
GLUCOSE UR STRIP-MCNC: NEGATIVE MG/DL
HCG UR QL: NEGATIVE
HGB UR QL STRIP: ABNORMAL
KETONES UR STRIP-MCNC: NEGATIVE MG/DL
LEUKOCYTE ESTERASE UR QL STRIP: ABNORMAL
NITRATE UR QL: NEGATIVE
PH UR STRIP: 6 [PH] (ref 5–7)
RBC #/AREA URNS AUTO: ABNORMAL /HPF
SP GR UR STRIP: 1.01 (ref 1–1.03)
SQUAMOUS #/AREA URNS AUTO: ABNORMAL /LPF
UROBILINOGEN UR STRIP-ACNC: 0.2 E.U./DL
WBC #/AREA URNS AUTO: ABNORMAL /HPF

## 2025-05-17 PROCEDURE — 99214 OFFICE O/P EST MOD 30 MIN: CPT

## 2025-05-17 PROCEDURE — 81025 URINE PREGNANCY TEST: CPT

## 2025-05-17 PROCEDURE — 3079F DIAST BP 80-89 MM HG: CPT

## 2025-05-17 PROCEDURE — 3075F SYST BP GE 130 - 139MM HG: CPT

## 2025-05-17 PROCEDURE — 81001 URINALYSIS AUTO W/SCOPE: CPT

## 2025-05-18 NOTE — PROGRESS NOTES
Urgent Care Clinic Visit    Urgent Care Pt states that she has been bleeding for the last couple of days. She believes that she is urinating blood because it is not like her normal period. She states that she has also passed some clots. She also complains of mid upper abdominal pain that started today but does not hurt as bad currently.                 5/17/2025     7:09 PM   Additional Questions   Roomed by Linda   Accompanied by beatriz

## 2025-05-18 NOTE — PATIENT INSTRUCTIONS
Given your symptoms of blood in urine and your history of alcohol use I do think you should be seen in the ER for further evaluation as there could be concern for a possible bleed in your abdomen.
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
## 2025-06-14 ENCOUNTER — HEALTH MAINTENANCE LETTER (OUTPATIENT)
Age: 45
End: 2025-06-14

## 2025-08-06 ENCOUNTER — TELEPHONE (OUTPATIENT)
Dept: GASTROENTEROLOGY | Facility: CLINIC | Age: 45
End: 2025-08-06
Payer: COMMERCIAL

## (undated) DEVICE — SU MONOCRYL 0 CT-1 27" Y340H

## (undated) DEVICE — SU VICRYL 0 CT-1 36" J946H

## (undated) DEVICE — GLOVE PROTEXIS BLUE W/NEU-THERA 7.0  2D73EB70

## (undated) DEVICE — SU MONOCRYL 4-0 PS-2 18" UND Y496G

## (undated) DEVICE — CATH TRAY FOLEY SURESTEP 16FR W/URINE MTR STATLK LF A303416A

## (undated) DEVICE — DRSG ABDOMINAL 07 1/2X8" 7197D

## (undated) DEVICE — GLOVE PROTEXIS W/NEU-THERA 6.5  2D73TE65

## (undated) DEVICE — LINEN BABY BLANKET 5434

## (undated) DEVICE — LABEL MEDICATION SYSTEM  3304

## (undated) DEVICE — PREP CHLORAPREP 26ML TINTED ORANGE  260815

## (undated) DEVICE — SOL NACL 0.9% IRRIG 1000ML BOTTLE 07138-09

## (undated) DEVICE — PACK C-SECTION LF PL15OTA83B

## (undated) DEVICE — SOL WATER IRRIG 1000ML BOTTLE 07139-09

## (undated) RX ORDER — EPINEPHRINE 1 MG/ML(1)
AMPUL (ML) INJECTION
Status: DISPENSED
Start: 2019-10-04

## (undated) RX ORDER — PHENYLEPHRINE HCL IN 0.9% NACL 1 MG/10 ML
SYRINGE (ML) INTRAVENOUS
Status: DISPENSED
Start: 2019-10-04

## (undated) RX ORDER — FENTANYL CITRATE 50 UG/ML
INJECTION, SOLUTION INTRAMUSCULAR; INTRAVENOUS
Status: DISPENSED
Start: 2019-10-04

## (undated) RX ORDER — MORPHINE SULFATE 1 MG/ML
INJECTION, SOLUTION EPIDURAL; INTRATHECAL; INTRAVENOUS
Status: DISPENSED
Start: 2019-10-04

## (undated) RX ORDER — ONDANSETRON 2 MG/ML
INJECTION INTRAMUSCULAR; INTRAVENOUS
Status: DISPENSED
Start: 2019-10-04

## (undated) RX ORDER — BUPIVACAINE HYDROCHLORIDE 2.5 MG/ML
INJECTION, SOLUTION EPIDURAL; INFILTRATION; INTRACAUDAL
Status: DISPENSED
Start: 2019-10-04